# Patient Record
Sex: MALE | Race: WHITE | NOT HISPANIC OR LATINO | Employment: UNEMPLOYED | URBAN - NONMETROPOLITAN AREA
[De-identification: names, ages, dates, MRNs, and addresses within clinical notes are randomized per-mention and may not be internally consistent; named-entity substitution may affect disease eponyms.]

---

## 2018-02-22 ENCOUNTER — TELEPHONE (OUTPATIENT)
Dept: FAMILY MEDICINE CLINIC | Facility: CLINIC | Age: 58
End: 2018-02-22

## 2018-02-22 DIAGNOSIS — E11.65 TYPE 2 DIABETES MELLITUS WITH HYPERGLYCEMIA, WITHOUT LONG-TERM CURRENT USE OF INSULIN (HCC): Primary | ICD-10-CM

## 2018-02-22 RX ORDER — GLIMEPIRIDE 2 MG/1
2 TABLET ORAL
Qty: 90 TABLET | Refills: 1 | Status: SHIPPED | OUTPATIENT
Start: 2018-02-22

## 2018-02-22 NOTE — TELEPHONE ENCOUNTER
Patient needs refill on Glimeperide 2mg 1 tab po every Am with breakfast  Send to [facility]

## 2018-03-06 DIAGNOSIS — R60.1 GENERALIZED EDEMA: Primary | ICD-10-CM

## 2018-03-06 RX ORDER — FUROSEMIDE 40 MG/1
TABLET ORAL
Qty: 90 TABLET | Refills: 5 | Status: SHIPPED | OUTPATIENT
Start: 2018-03-06

## 2018-04-09 ENCOUNTER — OFFICE VISIT (OUTPATIENT)
Dept: FAMILY MEDICINE CLINIC | Facility: CLINIC | Age: 58
End: 2018-04-09
Payer: MEDICARE

## 2018-04-09 VITALS
TEMPERATURE: 98.1 F | BODY MASS INDEX: 31.55 KG/M2 | OXYGEN SATURATION: 95 % | DIASTOLIC BLOOD PRESSURE: 70 MMHG | WEIGHT: 201 LBS | SYSTOLIC BLOOD PRESSURE: 114 MMHG | RESPIRATION RATE: 18 BRPM | HEIGHT: 67 IN | HEART RATE: 63 BPM

## 2018-04-09 DIAGNOSIS — K40.91 UNILATERAL RECURRENT INGUINAL HERNIA WITHOUT OBSTRUCTION OR GANGRENE: ICD-10-CM

## 2018-04-09 DIAGNOSIS — E55.9 VITAMIN D DEFICIENCY: ICD-10-CM

## 2018-04-09 DIAGNOSIS — G62.9 NEUROPATHY: ICD-10-CM

## 2018-04-09 DIAGNOSIS — D50.9 IRON DEFICIENCY ANEMIA, UNSPECIFIED IRON DEFICIENCY ANEMIA TYPE: ICD-10-CM

## 2018-04-09 DIAGNOSIS — E53.8 VITAMIN B12 DEFICIENCY: ICD-10-CM

## 2018-04-09 DIAGNOSIS — R10.33 PERIUMBILICAL ABDOMINAL PAIN: ICD-10-CM

## 2018-04-09 DIAGNOSIS — I10 ESSENTIAL HYPERTENSION: ICD-10-CM

## 2018-04-09 DIAGNOSIS — R19.7 DIARRHEA, UNSPECIFIED TYPE: ICD-10-CM

## 2018-04-09 DIAGNOSIS — Z13.29 SCREENING FOR HYPOTHYROIDISM: ICD-10-CM

## 2018-04-09 DIAGNOSIS — K70.31 ALCOHOLIC CIRRHOSIS OF LIVER WITH ASCITES (HCC): ICD-10-CM

## 2018-04-09 DIAGNOSIS — E78.49 OTHER HYPERLIPIDEMIA: ICD-10-CM

## 2018-04-09 DIAGNOSIS — E11.65 TYPE 2 DIABETES MELLITUS WITH HYPERGLYCEMIA, WITHOUT LONG-TERM CURRENT USE OF INSULIN (HCC): Primary | ICD-10-CM

## 2018-04-09 DIAGNOSIS — K42.9 UMBILICAL HERNIA WITHOUT OBSTRUCTION AND WITHOUT GANGRENE: ICD-10-CM

## 2018-04-09 DIAGNOSIS — K70.31 ASCITES DUE TO ALCOHOLIC CIRRHOSIS (HCC): ICD-10-CM

## 2018-04-09 DIAGNOSIS — K21.9 GASTROESOPHAGEAL REFLUX DISEASE, ESOPHAGITIS PRESENCE NOT SPECIFIED: ICD-10-CM

## 2018-04-09 DIAGNOSIS — D50.8 OTHER IRON DEFICIENCY ANEMIA: ICD-10-CM

## 2018-04-09 PROCEDURE — 99204 OFFICE O/P NEW MOD 45 MIN: CPT | Performed by: NURSE PRACTITIONER

## 2018-04-09 RX ORDER — ERGOCALCIFEROL (VITAMIN D2) 1250 MCG
1 CAPSULE ORAL WEEKLY
COMMUNITY
End: 2018-04-23 | Stop reason: SDUPTHER

## 2018-04-09 RX ORDER — FOLIC ACID 1 MG/1
1 TABLET ORAL DAILY
Refills: 0 | COMMUNITY
Start: 2018-03-12 | End: 2018-04-09 | Stop reason: SDUPTHER

## 2018-04-09 RX ORDER — OMEPRAZOLE 40 MG/1
40 CAPSULE, DELAYED RELEASE ORAL DAILY
COMMUNITY
End: 2018-08-12 | Stop reason: SDUPTHER

## 2018-04-09 RX ORDER — FLUTICASONE PROPIONATE 110 UG/1
2 AEROSOL, METERED RESPIRATORY (INHALATION) 2 TIMES DAILY
COMMUNITY
Start: 2017-02-23 | End: 2018-10-22 | Stop reason: ALTCHOICE

## 2018-04-09 RX ORDER — FERROUS SULFATE 325(65) MG
1 TABLET ORAL DAILY
Refills: 0 | COMMUNITY
Start: 2018-03-12 | End: 2018-05-29 | Stop reason: SDUPTHER

## 2018-04-09 RX ORDER — FOLIC ACID 1 MG/1
1 TABLET ORAL DAILY
Qty: 90 TABLET | Refills: 1 | Status: SHIPPED | OUTPATIENT
Start: 2018-04-09 | End: 2018-09-07 | Stop reason: SDUPTHER

## 2018-04-09 RX ORDER — GABAPENTIN 300 MG/1
100 CAPSULE ORAL
COMMUNITY
End: 2018-04-09 | Stop reason: SDUPTHER

## 2018-04-09 RX ORDER — PROPRANOLOL HYDROCHLORIDE 20 MG/1
20 TABLET ORAL EVERY 12 HOURS SCHEDULED
COMMUNITY
End: 2018-07-09 | Stop reason: SDUPTHER

## 2018-04-09 RX ORDER — POTASSIUM CHLORIDE 20 MEQ/1
20 TABLET, EXTENDED RELEASE ORAL 2 TIMES DAILY
COMMUNITY
End: 2018-06-15 | Stop reason: SDUPTHER

## 2018-04-09 RX ORDER — SPIRONOLACTONE 50 MG/1
50 TABLET, FILM COATED ORAL DAILY
COMMUNITY
End: 2018-05-29 | Stop reason: SDUPTHER

## 2018-04-09 RX ORDER — GABAPENTIN 300 MG/1
300 CAPSULE ORAL
Qty: 90 CAPSULE | Refills: 1 | Status: SHIPPED | OUTPATIENT
Start: 2018-04-09

## 2018-04-09 RX ORDER — LACTULOSE 10 G/15ML
20 SOLUTION ORAL 3 TIMES DAILY
COMMUNITY
End: 2018-05-21 | Stop reason: SDUPTHER

## 2018-04-09 RX ORDER — FOLIC ACID 1 MG/1
1000 TABLET ORAL DAILY
Qty: 90 TABLET | Refills: 1 | Status: SHIPPED | OUTPATIENT
Start: 2018-04-09 | End: 2018-04-09 | Stop reason: SDUPTHER

## 2018-04-09 RX ORDER — LACTULOSE 10 G/10G
10 SOLUTION ORAL 3 TIMES DAILY
COMMUNITY
End: 2018-04-09 | Stop reason: CLARIF

## 2018-04-09 NOTE — PROGRESS NOTES
Assessment/Plan:      Diagnoses and all orders for this visit:    Type 2 diabetes mellitus with hyperglycemia, without long-term current use of insulin (HCC)  -     Comprehensive metabolic panel; Future  -     HEMOGLOBIN A1C W/ EAG ESTIMATION; Future  -     Insulin, fasting; Future  -     Microalbumin / creatinine urine ratio    Iron deficiency anemia, unspecified iron deficiency anemia type  -     CBC and differential; Future    Other hyperlipidemia  -     Lipid panel; Future    Vitamin B12 deficiency  -     Vitamin B12; Future    Vitamin D deficiency  -     Vitamin D 25 hydroxy; Future    Screening for hypothyroidism  -     TSH, 3rd generation with T4 reflex; Future    Alcoholic cirrhosis of liver with ascites (HCC)  -     Discontinue: folic acid (FOLVITE) 1 mg tablet; Take 1 tablet (1,000 mcg total) by mouth daily  -     folic acid (FOLVITE) 1 mg tablet; Take 1 tablet (1 mg total) by mouth daily  -     Ambulatory referral to Gastroenterology; Future    Essential hypertension    Other iron deficiency anemia    Asc[facility] due to [facility] ([facility] )  -     Ambulatory referral to Gastroenterology; Future    Gastroesophageal reflux disease, esophagitis presence not specified    Unilateral recurrent inguinal hernia without obstruction or gangrene  -     Ambulatory referral to Gastroenterology; Future    Neuropathy  -     gabapentin (NEURONTIN) 300 mg capsule; Take 1 capsule (300 mg total) by mouth daily at bedtime    Diarrhea, unspecified type  -     Ambulatory referral to Gastroenterology; Future    Periumbilical abdominal pain  -     Ambulatory referral to Gastroenterology; Future    Umbilical hernia without obstruction and without gangrene  -     Ambulatory referral to Gastroenterology; Future    Other orders  -     Discontinue: folic acid (FOLVITE) 1 mg tablet; Take 1 tablet by mouth daily  -     fluticasone (FLOVENT HFA) 110 MCG/ACT inhaler;  Inhale 2 puffs 2 (two) times a day  -     ferrous sulfate 325 (65 Fe) mg tablet; Take 1 tablet by mouth daily  -     ergocalciferol (ERGOCALCIFEROL) 62421 units capsule; Take 1 capsule by mouth once a week  -     Discontinue: lactulose (CEPHULAC) 10 g packet; Take 10 g by mouth 3 (three) times a day  -     lactulose (CHRONULAC) 10 g/15 mL solution; Take 20 g by mouth 3 (three) times a day  -     rifaximin (XIFAXAN) 550 mg tablet; Take 550 mg by mouth every 12 (twelve) hours  -     spironolactone (ALDACTONE) 50 mg tablet; Take 50 mg by mouth daily  -     propranolol (INDERAL) 20 mg tablet; Take 20 mg by mouth every 12 (twelve) hours  -     Discontinue: gabapentin (NEURONTIN) 300 mg capsule; Take 100 mg by mouth daily at bedtime  -     omeprazole (PriLOSEC) 40 MG capsule; Take 40 mg by mouth daily  -     potassium chloride (K-DUR,KLOR-CON) 20 mEq tablet; Take 20 mEq by mouth 2 (two) times a day 2 tabs in am and one tab in pm          Subjective:     Patient ID: Jenny Skinner  is a 61 y o  male  Patient presents to office for initial physical exam and to establish care at [address] [place] [address]  Patient continues to be followed by [facility] for Hx of Cirrhosis of Liver and Hepatitis C  Patient is currently on a Liver Transplant List at [facility]  Patient continues to be followed by [facility] for Hx of Thrombocytopenia  Complete medical history and medications reviewed with patient and tolerating all medications well  Patient C/O right inguinal hernia is increasing in size over the past 3 weeks and does have intermittent tenderness of right inguinal area along with pain in umbilical area that now has a lump of mid umbilical area  Patient C/O diarrhea occurring after eating every meal and pain in mid-umbilical area that occurs when he eats a lot of food  Review of Systems    GENERAL:  Feels well, denies any significant changes in weight without trying    SKIN:  Denies rashes, lesions, opened areas, wounds, change in moles or any other skin changes  HEENT:  Denies any head injury or headaches  Negative blurred vision, floaters, spots before eyes, infections, or other vision problems  Negative significant changes in vision or hearing  Negative tinnitus, vertigo, or infections  Negative hay fever, sinus trouble, nasal discharge, bloody noses, or problems with smell  Negative sore throat, bleeding gums, ulcers, or sores  Glasses/Contacts: Glasses  Hearing Aids: NO  Dentures/Partials/Implants: NO  NECK:  Denies lumps, goiter, pain, swollen glands, or lymphadenopathy  BREASTS:  Denies lumps, pain, nipple discharge, swelling, redness, or any other changes  RESPIRATORY:  Denies cough, wheezing, shortness of breath, dyspnea, or orthopnea  CARDIOVASCULAR:  Denies chest pain or palpitations  GASTROINTESTINAL:  Appetite good, denies nausea, vomiting, or indigestion  Bowel movements normal occurring about once daily or every other day  C/O mid umbilical lump that is tender to touch  URINARY:  Denies frequency, incontinence, dysuria, hematuria, nocturia, or recent flank pain  GENITAL:  Denies penile discharge, ulcerations, lesions, or other problems  C/O right inguinal hernia that is increasing in size and causing increased tenderness over the past 3 weeks  PERIPHERAL VASCULAR:  Denies varicosities, swelling, skin changes, or pain  MUSCULOSKELETAL:  Denies back, joint, or muscle pain  Negative problems with mobility or movement  PSYCHIATRIC:  Denies problems with depression, anxiety, anger, or other psychiatric symptoms  NEUROLOGIC:  Denies fainting, dizziness, memory problems, seizures, tingling, motor or sensory loss  HEMATOLOGIC:  Denies easy bruising, bleeding, or anemia  ENDOCRINE:  Denies thyroid problems, temperature intolerance, excessive sweating, or other endocrine symptoms  Objective:     Physical Exam   Nursing note and vitals reviewed        GENERAL:  Appears well nourished, well groomed, in no acute distress  SKIN:  Palms warm, dry, color good  Nails without clubbing or cyanosis  No lesions, ulcerations, or wounds  HEAD:  Hair is average texture  Scalp without lesions, normocephalic, and atraumatic  EYES:  Visual fields full by confrontation  Conjunctiva pink, sclera white, PERRLA  Extraocular movements intact  Disc margins sharp, without hemorrhages or exudate  No arteriolar narrowing or A-V nicking  EARS:  B/L ear canals clear  B/L TMs clear with + light reflex  Acuity good to whispered voice  Carey midline  AC>BC  NOSE: Mucosa pink, moist, septum midline  Negative sinus tenderness  B/L turbinates pink, moist, non-edematous without exudate  MOUTH:  Oral mucosa pink  Pharynx pink, moist, without swelling, redness, or exudate  Dentition ok  Tonsils without enlargement or exudate  Tongue midline  NECK:  Supple, trachea midline, Negative thyromegaly, lymphadenopathy, or swollen glands  LYMPH NODES:  Negative enlargement of neck, axillary, epitrochlear, or inguinal nodes  THORAX/LUNGS  Thorax symmetric with good excursion  Lungs resonant  Breath sounds vesicular with no added sounds  Diaphragm descends within normal limits  CARDIOVASCULAR:  Carotid upstrokes brisk and without bruits  Apical impulse discrete and tapping, barely palpable in the 5th ICS/MCL  Normal S1 and Normal S2, Negative S3 or S4  Negative murmurs, thrills, lifts, or heaves  ABDOMEN:  Protuberant, bowel sounds normal active x 4 quadrants  Ascites of abdomen  Umbilical hernia  Right inguinal hernia  Negative tenderness  Negative masses  Negative hepatomegaly  Negative splenomegaly  Negative costovertebral tenderness  EXTREMITIES:  Warm, calves supple, non-tender, negative for edema  Negative stasis pigmentation or ulcers  +2 pulses throughout  MUSCULOSKELETAL:  Negative joint deformities      Good range of motion in hands, wrists, elbows, shoulders, spine, hips, knees, and ankles  Negative spinal curvature  NEUROLOGICAL:  Mental status:  Awake, alert, and oriented to person, place, time, and event  Normal thought processes  Cranial Nerves:  II-XII intact  Motor:  Good muscle bulk and tone  Strength: 5/5 throughout  Cerebellar:  Rapid alternating movements, point-to-point movements intact  Gait stable and fluid  Sensory:  Pinprick, light touch, position sense, vibration, and stereogenesis intact  Romberg: Negative  Reflexes: +2 throughout

## 2018-04-09 NOTE — PATIENT INSTRUCTIONS
Cirrhosis   AMBULATORY CARE:   Cirrhosis  is long-term scarring of the liver  The liver makes enzymes and bile that help digest food and gives your body energy  It also removes harmful material from your body, such as alcohol and other chemicals  Cirrhosis is caused by repeated damage to your liver over time  Scar tissue starts to replace healthy liver tissue  The scar tissue prevents the liver from working properly  Common signs and symptoms of cirrhosis:  You may not have any signs or symptoms until your liver damage is severe  You may have any of the following:  · Fatigue    · Bleeding and bruising easily    · Swelling of your feet, legs, or abdomen    · Nausea, loss of appetite, and weight loss    · Itching    · Jaundice (yellowing of your skin or eyes)    · Black bowel movements or dark urine  Seek care immediately if:   · You have pain during a bowel movement and it is black or contains blood  · You have a fast heart rate and fast breathing  · You are dizzy or confused  · You have severe pain in your abdomen  · You have trouble breathing  · Your vomit looks like it has coffee grinds or blood in it  Contact your healthcare provider if:   · You have a fever  · You have red or itchy skin  · You are in pain and feel weak  · You have questions or concerns about your condition or care  Treatment  may include any of the following:  · Medicines  may be used to treat high blood pressure in the portal vein (the vein that goes to your liver)  You may also need medicine to decrease extra fluid that collects in an area such as your legs or abdomen  Medicines may be used to decrease itching, or to treat a bacterial or viral infection  · Surgery  may be used to create a channel inside your liver to increase blood flow  This will help decrease swelling in your abdomen and lower blood pressure in the portal vein  Your risk for bleeding in your esophagus and stomach will also be decreased   You may need a liver transplant if your liver fails  Do not drink alcohol:  Alcohol will cause more damage to your liver  Manage cirrhosis:   · Do not smoke  Nicotine and other chemicals in cigarettes and cigars can cause blood vessel and lung damage  Ask your healthcare provider for information if you currently smoke and need help to quit  E-cigarettes or smokeless tobacco still contain nicotine  Talk to your healthcare provider before you use these products  · Eat a variety of healthy foods  Healthy foods include fruits, vegetables, whole-grain breads, low-fat dairy products, beans, lean meat, and fish  Ask if you need to be on a special diet  · Reach or maintain a healthy weight  You may develop fatty liver disease if you are overweight  Ask your healthcare provider for a healthy weight for you  He can help you create a safe weight loss plan if you are overweight  · Limit sodium (salt)  You may need to decrease the amount of sodium you eat if you have swelling caused by fluid buildup  Sodium is found in table salt and salty foods such as canned foods, frozen foods, and potato chips  · Drink liquids as directed  Ask how much liquid to drink each day and which liquids are best for you  For most people, good liquids to drink are water, juice, and milk  Liquids can help your liver work better  · Ask about vaccines  You may have a hard time fighting infection because of cirrhosis  Vaccines help protect you against viruses that can cause diseases such as the flu or hepatitis  Viral hepatitis is caused by a virus that leads to inflammation of the liver  You may need a hepatitis A or B vaccine  You may also need a pneumonia vaccine  Always get a flu vaccine each year as soon as it becomes available  · Ask about medicines  Some medicines can harm your liver  Acetaminophen is an example  Talk to your healthcare provider about all your medicines   Do not take any over-the-counter medicine or herbal supplements until your healthcare provider says it is okay  Follow up with your healthcare provider as directed:  Write down your questions so you remember to ask them during your visits  © 2017 [employer] Information is for End User's use only and may not be sold, redistributed or otherwise used for commercial purposes  All illustrations and images included in CareNotes® are the copyrighted property of [employer]  or [employer]  The above information is an  only  It is not intended as medical advice for individual conditions or treatments  Talk to your doctor, nurse or pharmacist before following any medical regimen to see if it is safe and effective for you  Wellness Visit for Adults   WHAT YOU NEED TO KNOW:   What is a wellness visit? A wellness visit is when you see your healthcare provider to get screened for health problems  You can also get advice on how to stay healthy  Write down your questions so you remember to ask them  Ask your healthcare provider how often you should have a wellness visit  What happens at a wellness visit? Your healthcare provider will ask about your health, and your family history of health problems  This includes high blood pressure, heart disease, and cancer  He or she will ask if you have symptoms that concern you, if you smoke, and about your mood  You may also be asked about your intake of medicines, supplements, food, and alcohol  Any of the following may be done:  · Your weight  will be checked  Your height may also be checked so your body mass index (BMI) can be calculated  Your BMI shows if you are at a healthy weight  · Your blood pressure  and heart rate will be checked  Your temperature may also be checked  · Blood and urine tests  may be done  Blood tests may be done to check your cholesterol levels  Abnormal cholesterol levels increase your risk for heart disease and stroke   You may also need a blood or urine test to check for diabetes if you are at increased risk  Urine tests may be done to look for signs of an infection or kidney disease  · A physical exam  includes checking your heartbeat and lungs with a stethoscope  Your healthcare provider may also check your skin to look for sun damage  · Screening tests  may be recommended  A screening test is done to check for diseases that may not cause symptoms  The screening tests you may need depend on your age, gender, family history, and lifestyle habits  For example, colorectal screening may be recommended if you are 48years old or older  What screening tests do I need if I am a woman? · A Pap smear  is used to screen for cervical cancer  Pap smears are usually done every 3 to 5 years depending on your age  You may need them more often if you have had abnormal Pap smear test results in the past  Ask your healthcare provider how often you should have a Pap smear  · A mammogram  is an x-ray of your breasts to screen for breast cancer  Experts recommend mammograms every 2 years starting at age 48 years  You may need a mammogram at age 52 years or younger if you have an increased risk for breast cancer  Talk to your healthcare provider about when you should start having mammograms and how often you need them  What vaccines might I need? · Get an influenza vaccine  every year  The influenza vaccine protects you from the flu  Several types of viruses cause the flu  The viruses change over time, so new vaccines are made each year  · Get a tetanus-diphtheria (Td) booster vaccine  every 10 years  This vaccine protects you against tetanus and diphtheria  Tetanus is a severe infection that may cause painful muscle spasms and lockjaw  Diphtheria is a severe bacterial infection that causes a thick covering in the back of your mouth and throat       · Get a human papillomavirus (HPV) vaccine  if you are female and aged 23 to 32 or male 23 to 24 and never received it  This vaccine protects you from HPV infection  HPV is the most common infection spread by sexual contact  HPV may also cause vaginal, penile, and anal cancers  · Get a pneumococcal vaccine  if you are aged 72 years or older  The pneumococcal vaccine is an injection given to protect you from pneumococcal disease  Pneumococcal disease is an infection caused by pneumococcal bacteria  The infection may cause pneumonia, meningitis, or an ear infection  · Get a shingles vaccine  if you are aged 61 or older, even if you have had shingles before  The shingles vaccine is an injection to protect you from the varicella-zoster virus  This is the same virus that causes chickenpox  Shingles is a painful rash that develops in people who had chickenpox or have been exposed to the virus  How can I eat healthy? My Plate is a model for planning healthy meals  It shows the types and amounts of foods that should go on your plate  Fruits and vegetables make up about half of your plate, and grains and protein make up the other half  A serving of dairy is included on the side of your plate  The amount of calories and serving sizes you need depends on your age, gender, weight, and height  Examples of healthy foods are listed below:  · Eat a variety of vegetables  such as dark green, red, and orange vegetables  You can also include canned vegetables low in sodium (salt) and frozen vegetables without added butter or sauces  · Eat a variety of fresh fruits , canned fruit in 100% juice, frozen fruit, and dried fruit  · Include whole grains  At least half of the grains you eat should be whole grains  Examples include whole-wheat bread, wheat pasta, brown rice, and whole-grain cereals such as oatmeal     · Eat a variety of protein foods such as seafood (fish and shellfish), lean meat, and poultry without skin (turkey and chicken)   Examples of lean meats include pork leg, shoulder, or tenderloin, and beef round, sirloin, tenderloin, and extra lean ground beef  Other protein foods include eggs and egg substitutes, beans, peas, soy products, nuts, and seeds  · Choose low-fat dairy products such as skim or 1% milk or low-fat yogurt, cheese, and cottage cheese  · Limit unhealthy fats  such as butter, hard margarine, and shortening  How much exercise do I need? Exercise at least 30 minutes per day on most days of the week  Some examples of exercise include walking, biking, dancing, and swimming  You can also fit in more physical activity by taking the stairs instead of the elevator or parking farther away from stores  Include muscle strengthening activities 2 days each week  Regular exercise provides many health benefits  It helps you manage your weight, and decreases your risk for type 2 diabetes, heart disease, stroke, and high blood pressure  Exercise can also help improve your mood  Ask your healthcare provider about the best exercise plan for you  What are some general health and safety guidelines I should follow? · Do not smoke  Nicotine and other chemicals in cigarettes and cigars can cause lung damage  Ask your healthcare provider for information if you currently smoke and need help to quit  E-cigarettes or smokeless tobacco still contain nicotine  Talk to your healthcare provider before you use these products  · Limit alcohol  A drink of alcohol is 12 ounces of beer, 5 ounces of wine, or 1½ ounces of liquor  · Lose weight, if needed  Being overweight increases your risk of certain health conditions  These include heart disease, high blood pressure, type 2 diabetes, and certain types of cancer  · Protect your skin  Do not sunbathe or use tanning beds  Use sunscreen with a SPF 15 or higher  Apply sunscreen at least 15 minutes before you go outside  Reapply sunscreen every 2 hours  Wear protective clothing, hats, and sunglasses when you are outside  · Drive safely    Always wear your seatbelt  Make sure everyone in your car wears a seatbelt  A seatbelt can save your life if you are in an accident  Do not use your cell phone when you are driving  This could distract you and cause an accident  Pull over if you need to make a call or send a text message  · Practice safe sex  Use latex condoms if are sexually active and have more than one partner  Your healthcare provider may recommend screening tests for sexually transmitted infections (STIs)  · Wear helmets, lifejackets, and protective gear  Always wear a helmet when you ride a bike or motorcycle, go skiing, or play sports that could cause a head injury  Wear protective equipment when you play sports  Wear a lifejacket when you are on a boat or doing water sports  CARE AGREEMENT:   You have the right to help plan your care  Learn about your health condition and how it may be treated  Discuss treatment options with your caregivers to decide what care you want to receive  You always have the right to refuse treatment  The above information is an  only  It is not intended as medical advice for individual conditions or treatments  Talk to your doctor, nurse or pharmacist before following any medical regimen to see if it is safe and effective for you  © 2017 [facility] Information is for End User's use only and may not be sold, redistributed or otherwise used for commercial purposes  All illustrations and images included in CareNotes® are the copyrighted property of [employer]  or [employer]av[employer]  Chronic Hypertension   AMBULATORY CARE:   Hypertension  is high blood pressure (BP)  Your BP is the force of your blood moving against the walls of your arteries  Normal BP is less than 120/80  Prehypertension is between 120/80 and 139/89  Hypertension is 140/90 or higher  Hypertension causes your BP to get so high that your heart has to work much harder than normal  This can damage your heart   Chronic hypertension is a long-term condition that you can control with a healthy lifestyle or medicines  A controlled blood pressure helps protect your organs, such as your heart, lungs, brain, and kidneys  Common symptoms include the following:   Headache     Blurred vision    Chest pain     Dizziness or weakness     Trouble breathing     Nosebleeds  Call 911 for any of the following: You have discomfort in your chest that feels like squeezing, pressure, fullness, or pain  You become confused or have difficulty speaking  You suddenly feel lightheaded or have trouble breathing  You have pain or discomfort in your back, neck, jaw, stomach, or arm  Seek care immediately if:   You have a severe headache or vision loss  You have weakness in an arm or leg  Contact your healthcare provider if:   You feel faint, dizzy, confused, or drowsy  You have been taking your BP medicine and your BP is still higher than your healthcare provider says it should be  You have questions or concerns about your condition or care  Treatment for chronic hypertension  may include medicine to lower your BP and lower your cholesterol level  A low cholesterol level helps prevent heart disease and makes it easier to control your blood pressure  Heart disease can make your blood pressure harder to control  You may also need to make lifestyle changes  Take your medicine exactly as directed  Manage chronic hypertension:  Talk with your healthcare provider about these and other ways to manage hypertension:  Take your BP at home  Sit and rest for 5 minutes before you take your BP  Extend your arm and support it on a flat surface  Your arm should be at the same level as your heart  Follow the directions that came with your BP monitor  If possible, take at least 2 BP readings each time  Take your BP at least twice a day at the same times each day, such as morning and evening   Keep a record of your BP readings and bring it to your follow-up visits  Ask your healthcare provider what your blood pressure should be  Limit sodium (salt) as directed  Too much sodium can affect your fluid balance  Check labels to find low-sodium or no-salt-added foods  Some low-sodium foods use potassium salts for flavor  Too much potassium can also cause health problems  Your healthcare provider will tell you how much sodium and potassium are safe for you to have in a day  He or she may recommend that you limit sodium to 2,300 mg a day  Follow the meal plan recommended by your healthcare provider  A dietitian or your provider can give you more information on low-sodium plans or the DASH (Dietary Approaches to Stop Hypertension) eating plan  The DASH plan is low in sodium, unhealthy fats, and total fat  It is high in potassium, calcium, and fiber  Exercise to maintain a healthy weight  Exercise at least 30 minutes per day, on most days of the week  This will help decrease your blood pressure  Ask about the best exercise plan for you  Decrease stress  This may help lower your BP  Learn ways to relax, such as deep breathing or listening to music  Limit alcohol  Women should limit alcohol to 1 drink a day  Men should limit alcohol to 2 drinks a day  A drink of alcohol is 12 ounces of beer, 5 ounces of wine, or 1½ ounces of liquor  Do not smoke  Nicotine and other chemicals in cigarettes and cigars can increase your BP and also cause lung damage  Ask your healthcare provider for information if you currently smoke and need help to quit  E-cigarettes or smokeless tobacco still contain nicotine  Talk to your healthcare provider before you use these products  Follow up with your healthcare provider as directed: You will need to return to have your BP checked and to have other lab tests done  Write down your questions so you remember to ask them during your visits     © 2017 [employer] Information is for End User's use only and may not be sold, redistributed or otherwise used for commercial purposes  All illustrations and images included in CareNotes® are the copyrighted property of [employer]  or [employer]onal Maravilla  The above information is an  only  It is not intended as medical advice for individual conditions or treatments  Talk to your doctor, nurse or pharmacist before following any medical regimen to see if it is safe and effective for you

## 2018-04-12 ENCOUNTER — TELEPHONE (OUTPATIENT)
Dept: FAMILY MEDICINE CLINIC | Facility: CLINIC | Age: 58
End: 2018-04-12

## 2018-04-12 NOTE — TELEPHONE ENCOUNTER
Left message informing patient of appointment with Dr Becca MCDANIELS on Thursday May 3rd at 10:45  Left phone number if need to change

## 2018-04-23 ENCOUNTER — TELEPHONE (OUTPATIENT)
Dept: FAMILY MEDICINE CLINIC | Facility: CLINIC | Age: 58
End: 2018-04-23

## 2018-04-23 DIAGNOSIS — E55.9 VITAMIN D DEFICIENCY: Primary | ICD-10-CM

## 2018-04-23 RX ORDER — ERGOCALCIFEROL (VITAMIN D2) 1250 MCG
1 CAPSULE ORAL WEEKLY
Qty: 4 CAPSULE | Refills: 5 | Status: SHIPPED | OUTPATIENT
Start: 2018-04-23 | End: 2018-04-30 | Stop reason: SDUPTHER

## 2018-04-30 DIAGNOSIS — E55.9 VITAMIN D DEFICIENCY: ICD-10-CM

## 2018-04-30 RX ORDER — ERGOCALCIFEROL (VITAMIN D2) 1250 MCG
1 CAPSULE ORAL WEEKLY
Qty: 4 CAPSULE | Refills: 5 | Status: SHIPPED | OUTPATIENT
Start: 2018-04-30 | End: 2018-10-15 | Stop reason: SDUPTHER

## 2018-05-07 LAB — HBA1C MFR BLD HPLC: 5.2 %

## 2018-05-21 DIAGNOSIS — K70.31 ALCOHOLIC CIRRHOSIS OF LIVER WITH ASCITES (HCC): Primary | ICD-10-CM

## 2018-05-21 RX ORDER — LACTULOSE 10 G/15ML
20 SOLUTION ORAL 3 TIMES DAILY
Qty: 3000 ML | Refills: 5 | Status: SHIPPED | OUTPATIENT
Start: 2018-05-21

## 2018-05-29 DIAGNOSIS — K70.11 ASCITES DUE TO ALCOHOLIC HEPATITIS: Primary | ICD-10-CM

## 2018-05-29 DIAGNOSIS — D63.8 ANEMIA IN OTHER CHRONIC DISEASES CLASSIFIED ELSEWHERE: ICD-10-CM

## 2018-05-29 DIAGNOSIS — R60.0 LOCALIZED EDEMA: ICD-10-CM

## 2018-05-29 RX ORDER — SPIRONOLACTONE 50 MG/1
50 TABLET, FILM COATED ORAL DAILY
Qty: 90 TABLET | Refills: 1 | Status: SHIPPED | OUTPATIENT
Start: 2018-05-29 | End: 2018-06-13 | Stop reason: SDUPTHER

## 2018-05-29 RX ORDER — FERROUS SULFATE 325(65) MG
1 TABLET ORAL DAILY
Qty: 90 TABLET | Refills: 1 | Status: SHIPPED | OUTPATIENT
Start: 2018-05-29 | End: 2018-10-15 | Stop reason: SDUPTHER

## 2018-06-13 DIAGNOSIS — R60.0 LOCALIZED EDEMA: ICD-10-CM

## 2018-06-13 DIAGNOSIS — K70.11 ASCITES DUE TO ALCOHOLIC HEPATITIS: ICD-10-CM

## 2018-06-13 RX ORDER — SPIRONOLACTONE 50 MG/1
50 TABLET, FILM COATED ORAL 4 TIMES DAILY PRN
Qty: 120 TABLET | Refills: 2 | Status: SHIPPED | OUTPATIENT
Start: 2018-06-13 | End: 2018-07-09 | Stop reason: SDUPTHER

## 2018-06-13 NOTE — TELEPHONE ENCOUNTER
Pharmacy does not have spironolactone in stock and would like to transfer prescription to [facility], but prescription is  and there is discrepancy as to how often patient taking  [facility] prescribed 4 x daily prn, office notes here are daily   Patient's girlfriend states that he does take medication 4 x a day due to edema

## 2018-06-15 DIAGNOSIS — E87.6 HYPOKALEMIA: Primary | ICD-10-CM

## 2018-06-18 RX ORDER — POTASSIUM CHLORIDE 20 MEQ/1
TABLET, EXTENDED RELEASE ORAL
Qty: 90 TABLET | Refills: 5 | Status: SHIPPED | OUTPATIENT
Start: 2018-06-18 | End: 2018-10-15 | Stop reason: SDUPTHER

## 2018-07-05 DIAGNOSIS — K74.69 OTHER CIRRHOSIS OF LIVER (HCC): Primary | ICD-10-CM

## 2018-07-09 DIAGNOSIS — K70.11 ASCITES DUE TO ALCOHOLIC HEPATITIS: ICD-10-CM

## 2018-07-09 DIAGNOSIS — R60.0 LOCALIZED EDEMA: ICD-10-CM

## 2018-07-09 RX ORDER — PROPRANOLOL HYDROCHLORIDE 20 MG/1
TABLET ORAL
Qty: 60 TABLET | Refills: 5 | Status: SHIPPED | OUTPATIENT
Start: 2018-07-09

## 2018-07-09 RX ORDER — SPIRONOLACTONE 50 MG/1
TABLET, FILM COATED ORAL
Qty: 120 TABLET | Refills: 5 | Status: SHIPPED | OUTPATIENT
Start: 2018-07-09

## 2018-08-12 DIAGNOSIS — K21.00 GASTROESOPHAGEAL REFLUX DISEASE WITH ESOPHAGITIS: Primary | ICD-10-CM

## 2018-08-13 RX ORDER — OMEPRAZOLE 40 MG/1
CAPSULE, DELAYED RELEASE ORAL
Qty: 30 CAPSULE | Refills: 5 | Status: SHIPPED | OUTPATIENT
Start: 2018-08-13

## 2018-09-07 DIAGNOSIS — K70.31 ALCOHOLIC CIRRHOSIS OF LIVER WITH ASCITES (HCC): ICD-10-CM

## 2018-09-10 RX ORDER — FOLIC ACID 1 MG/1
1 TABLET ORAL DAILY
Qty: 90 TABLET | Refills: 3 | Status: SHIPPED | OUTPATIENT
Start: 2018-09-10 | End: 2018-09-12 | Stop reason: SDUPTHER

## 2018-09-12 DIAGNOSIS — K70.31 ALCOHOLIC CIRRHOSIS OF LIVER WITH ASCITES (HCC): ICD-10-CM

## 2018-09-12 RX ORDER — FOLIC ACID 1 MG/1
1 TABLET ORAL DAILY
Qty: 90 TABLET | Refills: 5 | Status: SHIPPED | OUTPATIENT
Start: 2018-09-12 | End: 2018-10-02 | Stop reason: SDUPTHER

## 2018-10-02 ENCOUNTER — TELEPHONE (OUTPATIENT)
Dept: FAMILY MEDICINE CLINIC | Facility: CLINIC | Age: 58
End: 2018-10-02

## 2018-10-02 DIAGNOSIS — K70.31 ALCOHOLIC CIRRHOSIS OF LIVER WITH ASCITES (HCC): ICD-10-CM

## 2018-10-02 RX ORDER — FOLIC ACID 1 MG/1
1 TABLET ORAL DAILY
Qty: 90 TABLET | Refills: 1 | Status: SHIPPED | OUTPATIENT
Start: 2018-10-02

## 2018-10-10 ENCOUNTER — OFFICE VISIT (OUTPATIENT)
Dept: FAMILY MEDICINE CLINIC | Facility: CLINIC | Age: 58
End: 2018-10-10
Payer: MEDICARE

## 2018-10-10 VITALS
DIASTOLIC BLOOD PRESSURE: 58 MMHG | HEIGHT: 67 IN | HEART RATE: 96 BPM | WEIGHT: 179.2 LBS | SYSTOLIC BLOOD PRESSURE: 95 MMHG | OXYGEN SATURATION: 89 % | RESPIRATION RATE: 18 BRPM | BODY MASS INDEX: 28.12 KG/M2 | TEMPERATURE: 97.6 F

## 2018-10-10 DIAGNOSIS — K42.9 UMBILICAL HERNIA WITHOUT OBSTRUCTION AND WITHOUT GANGRENE: ICD-10-CM

## 2018-10-10 DIAGNOSIS — E11.65 TYPE 2 DIABETES MELLITUS WITH HYPERGLYCEMIA, WITHOUT LONG-TERM CURRENT USE OF INSULIN (HCC): ICD-10-CM

## 2018-10-10 DIAGNOSIS — I10 ESSENTIAL HYPERTENSION: ICD-10-CM

## 2018-10-10 DIAGNOSIS — K40.91 UNILATERAL RECURRENT INGUINAL HERNIA WITHOUT OBSTRUCTION OR GANGRENE: ICD-10-CM

## 2018-10-10 DIAGNOSIS — D72.828 OTHER ELEVATED WHITE BLOOD CELL (WBC) COUNT: ICD-10-CM

## 2018-10-10 DIAGNOSIS — K21.9 GASTROESOPHAGEAL REFLUX DISEASE, ESOPHAGITIS PRESENCE NOT SPECIFIED: ICD-10-CM

## 2018-10-10 DIAGNOSIS — K70.31 ALCOHOLIC CIRRHOSIS OF LIVER WITH ASCITES (HCC): Primary | ICD-10-CM

## 2018-10-10 DIAGNOSIS — G62.9 NEUROPATHY: ICD-10-CM

## 2018-10-10 DIAGNOSIS — E78.49 OTHER HYPERLIPIDEMIA: ICD-10-CM

## 2018-10-10 DIAGNOSIS — J18.9 COMMUNITY ACQUIRED PNEUMONIA, UNSPECIFIED LATERALITY: ICD-10-CM

## 2018-10-10 DIAGNOSIS — D50.9 IRON DEFICIENCY ANEMIA, UNSPECIFIED IRON DEFICIENCY ANEMIA TYPE: ICD-10-CM

## 2018-10-10 PROCEDURE — 99214 OFFICE O/P EST MOD 30 MIN: CPT | Performed by: NURSE PRACTITIONER

## 2018-10-10 RX ORDER — LEVOFLOXACIN 500 MG/1
500 TABLET, FILM COATED ORAL EVERY 24 HOURS
Qty: 5 TABLET | Refills: 0 | Status: SHIPPED | OUTPATIENT
Start: 2018-10-10 | End: 2018-10-15

## 2018-10-10 RX ORDER — ALBUTEROL SULFATE 2.5 MG/3ML
SOLUTION RESPIRATORY (INHALATION)
Qty: 100 VIAL | Refills: 5 | Status: SHIPPED | OUTPATIENT
Start: 2018-10-10

## 2018-10-10 NOTE — PROGRESS NOTES
Assessment/Plan:      Diagnoses and all orders for this visit:    Alcoholic cirrhosis of liver with ascites ([facility] [address] )  -     Comprehensive metabolic panel; Future  -     Lactic acid, plasma; Future  -     Ammonia; Future    Gastroesophageal reflux disease, esophagitis presence not specified    Type 2 diabetes mellitus with hyperglycemia, without long-term current use of insulin (HCC)    Essential hypertension    Neuropathy    Other hyperlipidemia    Iron deficiency anemia, unspecified iron deficiency anemia type    Umbilical hernia without obstruction and without gangrene    Unilateral recurrent inguinal hernia without obstruction or gangrene    Community acquired pneumonia, unspecified laterality  -     XR chest pa & lateral; Future  -     albuterol (2 5 mg/3 mL) 0 083 % nebulizer solution; 1 nebulizer tx every 6 hours x 10 days then prn SOB, Congestion  -     Nebulizer  -     Nebulizer Supplies  -     levofloxacin (LEVAQUIN) 500 mg tablet; Take 1 tablet (500 mg total) by mouth every 24 hours for 5 days    Other elevated white blood cell (WBC) count  -     XR chest pa & lateral; Future  -     CBC and differential; Future          Subjective:     Patient ID: Megha Saldana  is a 60 y o  male  Patient presents to office for follow up from hospitalization at [facility] for Sepsis, Community Acquired Pneumonia, Liver Cirrhosis, Ascites, and Portal Hypertension  Patient has paracentesis with 14 liters removed on 9/24/18, 12 3 liters removed on 9/28/18, and 6 8 liters removed on 10/3/18  Patient had Colonoscopy with biopsy done on 10/4/18 by Dr Jose Luis Estevez Specialist on 10/4/18  Patient was prescribed Levaquin 500 mg daily as outpatient and was discharged on 10/4/18  Patient is currently using his Home Oxygen @ 2L/min via NC as prescribed by [facility] Physician  Patient does not have his Oxygen today with him for office visit    Patient is scheduled for appointment with [facility] Liver Transplant Team and Hepatology for follow up on Ascites and Liver Cirrhosis  Review of Systems    GENERAL:  Feels well, denies any significant changes in weight without trying  SKIN:  Denies rashes, lesions, opened areas, wounds, change in moles or any other skin changes  HEENT:  Denies any head injury or headaches  Negative blurred vision, floaters, spots before eyes, infections, or other vision problems  Negative significant changes in vision or hearing  Negative tinnitus, vertigo, or infections  Negative hay fever, sinus trouble, nasal discharge, bloody noses, or problems with smell  Negative sore throat, bleeding gums, ulcers, or sores  Glasses/Contacts: Glasses  Hearing Aids: NO  Dentures/Partials/Implants: NO  NECK:  Denies lumps, goiter, pain, swollen glands, or lymphadenopathy  BREASTS:  Denies lumps, pain, nipple discharge, swelling, redness, or any other changes  RESPIRATORY:  Denies cough, wheezing, shortness of breath, dyspnea, or orthopnea  CARDIOVASCULAR:  Denies chest pain or palpitations  GASTROINTESTINAL:  Appetite good, denies nausea, vomiting, or indigestion  Bowel movements normal occurring about once daily or every other day  Continues with umbilical hernia  URINARY:  Denies frequency, incontinence, dysuria, hematuria, nocturia, or recent flank pain  GENITAL:  Denies penile discharge, ulcerations, lesions, or other problems  Continues with right inguinal hernia  PERIPHERAL VASCULAR:  Denies varicosities, swelling, skin changes, or pain  MUSCULOSKELETAL:  Denies back, joint, or muscle pain  Negative problems with mobility or movement  PSYCHIATRIC:  Denies problems with depression, anxiety, anger, or other psychiatric symptoms  NEUROLOGIC:  Denies fainting, dizziness, memory problems, seizures, tingling, motor or sensory loss  HEMATOLOGIC:  Denies easy bruising, bleeding, or anemia    ENDOCRINE:  Denies thyroid problems, temperature intolerance, excessive sweating, or other endocrine symptoms  Objective:     Physical Exam   Nursing note and vitals reviewed  GENERAL:  Appears well nourished, well groomed, in no acute distress  SKIN:  Palms warm, dry, color good  Nails without clubbing or cyanosis  No lesions, ulcerations, or wounds  HEAD:  Hair is average texture  Scalp without lesions, normocephalic, and atraumatic  EYES:  Visual fields full by confrontation  Conjunctiva pink, sclera white, PERRLA  Extraocular movements intact  EARS:  B/L ear canals clear  B/L TMs clear with + light reflex  Acuity good to whispered voice  NOSE: Mucosa pink, moist, septum midline  Negative sinus tenderness  B/L turbinates pink, moist, non-edematous without exudate  MOUTH:  Oral mucosa pink  Pharynx pink, moist, without swelling, redness, or exudate  Dentition ok  Tonsils without enlargement or exudate  Tongue midline  NECK:  Supple, trachea midline, Negative thyromegaly, lymphadenopathy, or swollen glands  LYMPH NODES:  Negative enlargement of neck, axillary, epitrochlear, or inguinal nodes  THORAX/LUNGS  Thorax symmetric with good excursion  Lungs resonant  Breath sounds wheezing and crackles of left lower lobe  Diaphragm descends within normal limits  CARDIOVASCULAR:  Carotid upstrokes brisk and without bruits  Apical impulse discrete and tapping, barely palpable in the 5th ICS/MCL  Normal S1 and Normal S2, Negative S3 or S4  Negative murmurs, thrills, lifts, or heaves  ABDOMEN:  Protuberant, bowel sounds normal active x 4 quadrants  Severe ascites of abdomen  Umbilical hernia  Right inguinal hernia    + generalized abdominal tenderness  Negative masses  Negative hepatomegaly  Negative splenomegaly  Negative costovertebral tenderness  EXTREMITIES:  Warm, calves supple, non-tender, negative for edema  Negative stasis pigmentation or ulcers  +2 pulses throughout  MUSCULOSKELETAL:  Negative joint deformities      Good range of motion in hands, wrists, elbows, shoulders, spine, hips, knees, and ankles  Negative spinal curvature  NEUROLOGICAL:  Mental status:  Awake, alert, and oriented to person, place, time, and event   Normal thought processes

## 2018-10-10 NOTE — PATIENT INSTRUCTIONS
Cirrhosis   AMBULATORY CARE:   Cirrhosis  is long-term scarring of the liver  The liver makes enzymes and bile that help digest food and gives your body energy  It also removes harmful material from your body, such as alcohol and other chemicals  Cirrhosis is caused by repeated damage to your liver over time  Scar tissue starts to replace healthy liver tissue  The scar tissue prevents the liver from working properly  Common signs and symptoms of cirrhosis:  You may not have any signs or symptoms until your liver damage is severe  You may have any of the following:  · Fatigue    · Bleeding and bruising easily    · Swelling of your feet, legs, or abdomen    · Nausea, loss of appetite, and weight loss    · Itching    · Jaundice (yellowing of your skin or eyes)    · Black bowel movements or dark urine  Seek care immediately if:   · You have pain during a bowel movement and it is black or contains blood  · You have a fast heart rate and fast breathing  · You are dizzy or confused  · You have severe pain in your abdomen  · You have trouble breathing  · Your vomit looks like it has coffee grinds or blood in it  Contact your healthcare provider if:   · You have a fever  · You have red or itchy skin  · You are in pain and feel weak  · You have questions or concerns about your condition or care  Treatment  may include any of the following:  · Medicines  may be used to treat high blood pressure in the portal vein (the vein that goes to your liver)  You may also need medicine to decrease extra fluid that collects in an area such as your legs or abdomen  Medicines may be used to decrease itching, or to treat a bacterial or viral infection  · Surgery  may be used to create a channel inside your liver to increase blood flow  This will help decrease swelling in your abdomen and lower blood pressure in the portal vein  Your risk for bleeding in your esophagus and stomach will also be decreased   You may need a liver transplant if your liver fails  Do not drink alcohol:  Alcohol will cause more damage to your liver  Manage cirrhosis:   · Do not smoke  Nicotine and other chemicals in cigarettes and cigars can cause blood vessel and lung damage  Ask your healthcare provider for information if you currently smoke and need help to quit  E-cigarettes or smokeless tobacco still contain nicotine  Talk to your healthcare provider before you use these products  · Eat a variety of healthy foods  Healthy foods include fruits, vegetables, whole-grain breads, low-fat dairy products, beans, lean meat, and fish  Ask if you need to be on a special diet  · Reach or maintain a healthy weight  You may develop fatty liver disease if you are overweight  Ask your healthcare provider for a healthy weight for you  He can help you create a safe weight loss plan if you are overweight  · Limit sodium (salt)  You may need to decrease the amount of sodium you eat if you have swelling caused by fluid buildup  Sodium is found in table salt and salty foods such as canned foods, frozen foods, and potato chips  · Drink liquids as directed  Ask how much liquid to drink each day and which liquids are best for you  For most people, good liquids to drink are water, juice, and milk  Liquids can help your liver work better  · Ask about vaccines  You may have a hard time fighting infection because of cirrhosis  Vaccines help protect you against viruses that can cause diseases such as the flu or hepatitis  Viral hepatitis is caused by a virus that leads to inflammation of the liver  You may need a hepatitis A or B vaccine  You may also need a pneumonia vaccine  Always get a flu vaccine each year as soon as it becomes available  · Ask about medicines  Some medicines can harm your liver  Acetaminophen is an example  Talk to your healthcare provider about all your medicines   Do not take any over-the-counter medicine or herbal supplements until your healthcare provider says it is okay  Follow up with your healthcare provider as directed:  Write down your questions so you remember to ask them during your visits  © 2017 [employer] Information is for End User's use only and may not be sold, redistributed or otherwise used for commercial purposes  All illustrations and images included in CareNotes® are the copyrighted property of [employer]  or [employer]onal Maravilla  The above information is an  only  It is not intended as medical advice for individual conditions or treatments  Talk to your doctor, nurse or pharmacist before following any medical regimen to see if it is safe and effective for you  Wellness Visit for Adults   WHAT YOU NEED TO KNOW:   What is a wellness visit? A wellness visit is when you see your healthcare provider to get screened for health problems  You can also get advice on how to stay healthy  Write down your questions so you remember to ask them  Ask your healthcare provider how often you should have a wellness visit  What happens at a wellness visit? Your healthcare provider will ask about your health, and your family history of health problems  This includes high blood pressure, heart disease, and cancer  He or she will ask if you have symptoms that concern you, if you smoke, and about your mood  You may also be asked about your intake of medicines, supplements, food, and alcohol  Any of the following may be done:  · Your weight  will be checked  Your height may also be checked so your body mass index (BMI) can be calculated  Your BMI shows if you are at a healthy weight  · Your blood pressure  and heart rate will be checked  Your temperature may also be checked  · Blood and urine tests  may be done  Blood tests may be done to check your cholesterol levels  Abnormal cholesterol levels increase your risk for heart disease and stroke   You may also need a blood or urine test to check for diabetes if you are at increased risk  Urine tests may be done to look for signs of an infection or kidney disease  · A physical exam  includes checking your heartbeat and lungs with a stethoscope  Your healthcare provider may also check your skin to look for sun damage  · Screening tests  may be recommended  A screening test is done to check for diseases that may not cause symptoms  The screening tests you may need depend on your age, gender, family history, and lifestyle habits  For example, colorectal screening may be recommended if you are 48years old or older  What screening tests do I need if I am a woman? · A Pap smear  is used to screen for cervical cancer  Pap smears are usually done every 3 to 5 years depending on your age  You may need them more often if you have had abnormal Pap smear test results in the past  Ask your healthcare provider how often you should have a Pap smear  · A mammogram  is an x-ray of your breasts to screen for breast cancer  Experts recommend mammograms every 2 years starting at age 48 years  You may need a mammogram at age 52 years or younger if you have an increased risk for breast cancer  Talk to your healthcare provider about when you should start having mammograms and how often you need them  What vaccines might I need? · Get an influenza vaccine  every year  The influenza vaccine protects you from the flu  Several types of viruses cause the flu  The viruses change over time, so new vaccines are made each year  · Get a tetanus-diphtheria (Td) booster vaccine  every 10 years  This vaccine protects you against tetanus and diphtheria  Tetanus is a severe infection that may cause painful muscle spasms and lockjaw  Diphtheria is a severe bacterial infection that causes a thick covering in the back of your mouth and throat       · Get a human papillomavirus (HPV) vaccine  if you are female and aged 23 to 32 or male 23 to 24 and never received it  This vaccine protects you from HPV infection  HPV is the most common infection spread by sexual contact  HPV may also cause vaginal, penile, and anal cancers  · Get a pneumococcal vaccine  if you are aged 72 years or older  The pneumococcal vaccine is an injection given to protect you from pneumococcal disease  Pneumococcal disease is an infection caused by pneumococcal bacteria  The infection may cause pneumonia, meningitis, or an ear infection  · Get a shingles vaccine  if you are aged 61 or older, even if you have had shingles before  The shingles vaccine is an injection to protect you from the varicella-zoster virus  This is the same virus that causes chickenpox  Shingles is a painful rash that develops in people who had chickenpox or have been exposed to the virus  How can I eat healthy? My Plate is a model for planning healthy meals  It shows the types and amounts of foods that should go on your plate  Fruits and vegetables make up about half of your plate, and grains and protein make up the other half  A serving of dairy is included on the side of your plate  The amount of calories and serving sizes you need depends on your age, gender, weight, and height  Examples of healthy foods are listed below:  · Eat a variety of vegetables  such as dark green, red, and orange vegetables  You can also include canned vegetables low in sodium (salt) and frozen vegetables without added butter or sauces  · Eat a variety of fresh fruits , canned fruit in 100% juice, frozen fruit, and dried fruit  · Include whole grains  At least half of the grains you eat should be whole grains  Examples include whole-wheat bread, wheat pasta, brown rice, and whole-grain cereals such as oatmeal     · Eat a variety of protein foods such as seafood (fish and shellfish), lean meat, and poultry without skin (turkey and chicken)   Examples of lean meats include pork leg, shoulder, or tenderloin, and beef round, sirloin, tenderloin, and extra lean ground beef  Other protein foods include eggs and egg substitutes, beans, peas, soy products, nuts, and seeds  · Choose low-fat dairy products such as skim or 1% milk or low-fat yogurt, cheese, and cottage cheese  · Limit unhealthy fats  such as butter, hard margarine, and shortening  How much exercise do I need? Exercise at least 30 minutes per day on most days of the week  Some examples of exercise include walking, biking, dancing, and swimming  You can also fit in more physical activity by taking the stairs instead of the elevator or parking farther away from stores  Include muscle strengthening activities 2 days each week  Regular exercise provides many health benefits  It helps you manage your weight, and decreases your risk for type 2 diabetes, heart disease, stroke, and high blood pressure  Exercise can also help improve your mood  Ask your healthcare provider about the best exercise plan for you  What are some general health and safety guidelines I should follow? · Do not smoke  Nicotine and other chemicals in cigarettes and cigars can cause lung damage  Ask your healthcare provider for information if you currently smoke and need help to quit  E-cigarettes or smokeless tobacco still contain nicotine  Talk to your healthcare provider before you use these products  · Limit alcohol  A drink of alcohol is 12 ounces of beer, 5 ounces of wine, or 1½ ounces of liquor  · Lose weight, if needed  Being overweight increases your risk of certain health conditions  These include heart disease, high blood pressure, type 2 diabetes, and certain types of cancer  · Protect your skin  Do not sunbathe or use tanning beds  Use sunscreen with a SPF 15 or higher  Apply sunscreen at least 15 minutes before you go outside  Reapply sunscreen every 2 hours  Wear protective clothing, hats, and sunglasses when you are outside  · Drive safely    Always wear your seatbelt  Make sure everyone in your car wears a seatbelt  A seatbelt can save your life if you are in an accident  Do not use your cell phone when you are driving  This could distract you and cause an accident  Pull over if you need to make a call or send a text message  · Practice safe sex  Use latex condoms if are sexually active and have more than one partner  Your healthcare provider may recommend screening tests for sexually transmitted infections (STIs)  · Wear helmets, lifejackets, and protective gear  Always wear a helmet when you ride a bike or motorcycle, go skiing, or play sports that could cause a head injury  Wear protective equipment when you play sports  Wear a lifejacket when you are on a boat or doing water sports  CARE AGREEMENT:   You have the right to help plan your care  Learn about your health condition and how it may be treated  Discuss treatment options with your caregivers to decide what care you want to receive  You always have the right to refuse treatment  The above information is an  only  It is not intended as medical advice for individual conditions or treatments  Talk to your doctor, nurse or pharmacist before following any medical regimen to see if it is safe and effective for you  © 2017 [facility] Information is for End User's use only and may not be sold, redistributed or otherwise used for commercial purposes  All illustrations and images included in CareNotes® are the copyrighted property of [employer] M [employer]  or Donal Maravilla

## 2018-10-12 DIAGNOSIS — K70.31 ALCOHOLIC CIRRHOSIS OF LIVER WITH ASCITES (HCC): ICD-10-CM

## 2018-10-12 DIAGNOSIS — E87.6 HYPOKALEMIA: ICD-10-CM

## 2018-10-12 DIAGNOSIS — E55.9 VITAMIN D DEFICIENCY: ICD-10-CM

## 2018-10-12 DIAGNOSIS — D63.8 ANEMIA IN OTHER CHRONIC DISEASES CLASSIFIED ELSEWHERE: ICD-10-CM

## 2018-10-12 DIAGNOSIS — E87.1 HYPONATREMIA: Primary | ICD-10-CM

## 2018-10-12 LAB — HBA1C MFR BLD HPLC: 5.4 %

## 2018-10-15 ENCOUNTER — TELEPHONE (OUTPATIENT)
Dept: FAMILY MEDICINE CLINIC | Facility: CLINIC | Age: 58
End: 2018-10-15

## 2018-10-15 RX ORDER — SODIUM CHLORIDE 1000 MG
1 TABLET, SOLUBLE MISCELLANEOUS 3 TIMES DAILY
Qty: 90 TABLET | Refills: 1 | Status: SHIPPED | OUTPATIENT
Start: 2018-10-15

## 2018-10-15 RX ORDER — FERROUS SULFATE 325(65) MG
1 TABLET ORAL 2 TIMES DAILY WITH MEALS
Qty: 180 TABLET | Refills: 1 | Status: SHIPPED | OUTPATIENT
Start: 2018-10-15 | End: 2018-11-12 | Stop reason: SDUPTHER

## 2018-10-15 RX ORDER — ERGOCALCIFEROL (VITAMIN D2) 1250 MCG
1 CAPSULE ORAL WEEKLY
Qty: 12 CAPSULE | Refills: 1 | Status: SHIPPED | OUTPATIENT
Start: 2018-10-15

## 2018-10-15 RX ORDER — POTASSIUM CHLORIDE 20 MEQ/1
20 TABLET, EXTENDED RELEASE ORAL 2 TIMES DAILY
Qty: 60 TABLET | Refills: 5 | Status: SHIPPED | OUTPATIENT
Start: 2018-10-15

## 2018-10-15 NOTE — TELEPHONE ENCOUNTER
Patients girlfriend called with medications that were perscribed to him in the hospital  Ceftin 250mg 1 tab BID, Pantoprazole coated 40mg, Oxycodone 5mg PRN q6hrs, and Nystatin 1000 1mL 5mL 4x daily

## 2018-10-17 ENCOUNTER — TELEPHONE (OUTPATIENT)
Dept: FAMILY MEDICINE CLINIC | Facility: CLINIC | Age: 58
End: 2018-10-17

## 2018-10-17 NOTE — TELEPHONE ENCOUNTER
Please notify patient I escribed Sodium Chloride 1gm 3 x daily due to low sodium level and I want him to decrease his Potassium Chloride 20 meq to 1 tab po 2 x daily due to elevated Potassium Level and have repeat CMP and CBC with diff drawn in 4 weeks  Lab order placed on 10/15/18

## 2018-10-18 DIAGNOSIS — K70.31 ALCOHOLIC CIRRHOSIS OF LIVER WITH ASCITES (HCC): Primary | ICD-10-CM

## 2018-10-18 DIAGNOSIS — R10.84 GENERALIZED ABDOMINAL PAIN: ICD-10-CM

## 2018-10-18 RX ORDER — OXYCODONE HYDROCHLORIDE 5 MG/1
TABLET ORAL
Refills: 0 | COMMUNITY
Start: 2018-10-05 | End: 2018-10-18 | Stop reason: SDUPTHER

## 2018-10-18 RX ORDER — OXYCODONE HYDROCHLORIDE 5 MG/1
5 TABLET ORAL EVERY 6 HOURS PRN
Qty: 90 TABLET | Refills: 0 | Status: SHIPPED | OUTPATIENT
Start: 2018-10-18

## 2018-10-19 DIAGNOSIS — R06.02 SHORTNESS OF BREATH: ICD-10-CM

## 2018-10-19 DIAGNOSIS — J18.9 PNEUMONIA DUE TO INFECTIOUS ORGANISM, UNSPECIFIED LATERALITY, UNSPECIFIED PART OF LUNG: ICD-10-CM

## 2018-10-19 DIAGNOSIS — J98.11 ATELECTASIS: Primary | ICD-10-CM

## 2018-10-19 DIAGNOSIS — J45.40 MODERATE PERSISTENT ASTHMA, UNSPECIFIED WHETHER COMPLICATED: ICD-10-CM

## 2018-10-22 RX ORDER — BUDESONIDE AND FORMOTEROL FUMARATE DIHYDRATE 160; 4.5 UG/1; UG/1
2 AEROSOL RESPIRATORY (INHALATION) 2 TIMES DAILY
Qty: 1 INHALER | Refills: 5 | Status: SHIPPED | OUTPATIENT
Start: 2018-10-22 | End: 2018-10-23 | Stop reason: SDUPTHER

## 2018-10-23 ENCOUNTER — TELEPHONE (OUTPATIENT)
Dept: FAMILY MEDICINE CLINIC | Facility: CLINIC | Age: 58
End: 2018-10-23

## 2018-10-23 DIAGNOSIS — J98.11 ATELECTASIS: ICD-10-CM

## 2018-10-23 DIAGNOSIS — J45.40 MODERATE PERSISTENT ASTHMA, UNSPECIFIED WHETHER COMPLICATED: ICD-10-CM

## 2018-10-23 DIAGNOSIS — J18.9 PNEUMONIA DUE TO INFECTIOUS ORGANISM, UNSPECIFIED LATERALITY, UNSPECIFIED PART OF LUNG: ICD-10-CM

## 2018-10-23 DIAGNOSIS — R06.02 SHORTNESS OF BREATH: ICD-10-CM

## 2018-10-23 RX ORDER — BUDESONIDE AND FORMOTEROL FUMARATE DIHYDRATE 160; 4.5 UG/1; UG/1
2 AEROSOL RESPIRATORY (INHALATION) 2 TIMES DAILY
Qty: 1 INHALER | Refills: 5 | Status: SHIPPED | OUTPATIENT
Start: 2018-10-23 | End: 2018-11-01 | Stop reason: ALTCHOICE

## 2018-10-23 RX ORDER — LEVOFLOXACIN 500 MG/1
500 TABLET, FILM COATED ORAL DAILY
Qty: 7 TABLET | Refills: 1 | Status: SHIPPED | OUTPATIENT
Start: 2018-10-23 | End: 2018-10-30

## 2018-10-23 NOTE — TELEPHONE ENCOUNTER
Please instruct patient that I escribed Symbicort HFA 160mcg/Inh si puffs INH every AM and PM instead of Flovent HFA  I also escribed Rx Levaquin 500mg daily x 7 days to [facility]

## 2018-11-01 DIAGNOSIS — J43.1 PANLOBULAR EMPHYSEMA (HCC): Primary | ICD-10-CM

## 2018-11-05 DIAGNOSIS — K21.00 GASTROESOPHAGEAL REFLUX DISEASE WITH ESOPHAGITIS: Primary | ICD-10-CM

## 2018-11-05 RX ORDER — PANTOPRAZOLE SODIUM 40 MG/1
40 TABLET, DELAYED RELEASE ORAL DAILY
Qty: 30 TABLET | Refills: 5 | Status: SHIPPED | OUTPATIENT
Start: 2018-11-05

## 2018-11-05 RX ORDER — PANTOPRAZOLE SODIUM 40 MG/1
TABLET, DELAYED RELEASE ORAL
Refills: 0 | COMMUNITY
Start: 2018-10-03 | End: 2018-11-05 | Stop reason: SDUPTHER

## 2018-11-12 DIAGNOSIS — D63.8 ANEMIA IN OTHER CHRONIC DISEASES CLASSIFIED ELSEWHERE: ICD-10-CM

## 2018-11-12 RX ORDER — FERROUS SULFATE 325(65) MG
1 TABLET ORAL 2 TIMES DAILY WITH MEALS
Qty: 180 TABLET | Refills: 1 | Status: SHIPPED | OUTPATIENT
Start: 2018-11-12 | End: 2018-11-29 | Stop reason: SDUPTHER

## 2018-11-19 ENCOUNTER — OFFICE VISIT (OUTPATIENT)
Dept: FAMILY MEDICINE CLINIC | Facility: CLINIC | Age: 58
End: 2018-11-19
Payer: MEDICARE

## 2018-11-19 VITALS
WEIGHT: 153.8 LBS | HEIGHT: 67 IN | OXYGEN SATURATION: 95 % | DIASTOLIC BLOOD PRESSURE: 42 MMHG | HEART RATE: 68 BPM | SYSTOLIC BLOOD PRESSURE: 70 MMHG | BODY MASS INDEX: 24.14 KG/M2 | RESPIRATION RATE: 18 BRPM | TEMPERATURE: 98 F

## 2018-11-19 DIAGNOSIS — K40.91 UNILATERAL RECURRENT INGUINAL HERNIA WITHOUT OBSTRUCTION OR GANGRENE: ICD-10-CM

## 2018-11-19 DIAGNOSIS — K42.9 UMBILICAL HERNIA WITHOUT OBSTRUCTION AND WITHOUT GANGRENE: ICD-10-CM

## 2018-11-19 DIAGNOSIS — I95.89 OTHER SPECIFIED HYPOTENSION: ICD-10-CM

## 2018-11-19 DIAGNOSIS — G62.9 NEUROPATHY: ICD-10-CM

## 2018-11-19 DIAGNOSIS — K70.31 ALCOHOLIC CIRRHOSIS OF LIVER WITH ASCITES (HCC): ICD-10-CM

## 2018-11-19 DIAGNOSIS — E11.65 TYPE 2 DIABETES MELLITUS WITH HYPERGLYCEMIA, WITHOUT LONG-TERM CURRENT USE OF INSULIN (HCC): ICD-10-CM

## 2018-11-19 DIAGNOSIS — K21.9 GASTROESOPHAGEAL REFLUX DISEASE, ESOPHAGITIS PRESENCE NOT SPECIFIED: ICD-10-CM

## 2018-11-19 DIAGNOSIS — K72.10 CHRONIC LIVER FAILURE WITHOUT HEPATIC COMA (HCC): ICD-10-CM

## 2018-11-19 DIAGNOSIS — D50.9 IRON DEFICIENCY ANEMIA, UNSPECIFIED IRON DEFICIENCY ANEMIA TYPE: ICD-10-CM

## 2018-11-19 DIAGNOSIS — E78.49 OTHER HYPERLIPIDEMIA: ICD-10-CM

## 2018-11-19 DIAGNOSIS — Z00.00 ENCOUNTER FOR MEDICARE ANNUAL WELLNESS EXAM: Primary | ICD-10-CM

## 2018-11-19 PROCEDURE — G0439 PPPS, SUBSEQ VISIT: HCPCS | Performed by: NURSE PRACTITIONER

## 2018-11-19 PROCEDURE — 99214 OFFICE O/P EST MOD 30 MIN: CPT | Performed by: NURSE PRACTITIONER

## 2018-11-19 NOTE — PATIENT INSTRUCTIONS
Ascites   WHAT YOU NEED TO KNOW:   What is ascites? Ascites is excess fluid in your lower abdomen  The fluid causes swelling  Ascites can signal a more serious problem in your body  What causes ascites? · Liver disease, such as cirrhosis or hepatitis     · Cancer     · Congestive heart failure     · Blood clots in the veins that enter and leave the liver  What are the signs and symptoms of ascites? · Rapid weight gain and swelling     · Swollen abdomen     · Shortness of breath     · Stretch marks and bulging veins on the abdomen     · Nausea     · A feeling of fullness after eating little food  How is ascites diagnosed? Your healthcare provider will ask you to lie on your back while he taps or presses on your abdomen  He will ask about your symptoms and when they started  He will ask about alcohol or IV (intravenous) drug use, sexual activity, and any blood transfusions you have had  Tell him if you have had heart problems or cancer  · Blood and urine tests:  Healthcare providers will test samples of your blood and urine to see how your liver and kidneys are working  · Abdominal ultrasound:  Gel helps your healthcare provider move a sensor over your abdomen during an ultrasound  The sensor uses sound waves to show pictures of your organs on a monitor  · CT scan:  A CT scan uses a computer to take x-rays of the organs and blood vessels in your abdomen  You may be given dye called contrast to help the pictures show up better  Tell the healthcare provider if you are allergic to iodine or seafood  You may also be allergic to the dye  · Ascites fluid test:  Healthcare providers use a needle to take a sample of fluid from your abdomen for testing  This procedure is called a fluid tap or paracentesis  Numbing medicine makes you more comfortable during this test  Tests on the fluid help find the cause of your ascites and screen for infection       · 24-hour urine collection:  You will use a container to hold all of your urine collected over 24 hours  The urine must be kept cold until it is tested  · Other tests:  Tests such as endoscopy or laparoscopy use a scope (tube) with a camera on its tip to see your lower esophagus or liver  Anesthesia medicine will keep you numb or asleep during the procedure  Healthcare providers will look for bleeding or blood clots  A tissue sample (biopsy) may be taken  How is ascites treated? Ascites treatment usually combines medicines with changes to your eating and drinking habits  Other treatments are used if your ascites does not improve or if the condition that caused the ascites is getting worse  · Medicines:      ¨ Diuretics:  Diuretics help decrease the fluid in your abdomen by causing you to urinate more often  You will lose weight as the fluid decreases  Ask your healthcare provider how much weight you should expect to lose each day  ¨ Antibiotics: These medicines are used to prevent or fight infections caused by bacteria  ¨ Vaccines or antiviral medicines:  When ascites is caused by hepatitis (a virus that attacks your liver), antiviral medicines may help prevent more damage to your liver  Healthcare providers may give you vaccines to prevent hepatitis  · Sodium and liquid restriction:      ¨ Sodium:  You must lower the amount of sodium (salt) you eat and drink to reduce the fluid in your abdomen  Sodium is in many foods, even when you cannot taste it  You may need to be on a 2-gram sodium diet, which limits your sodium to less than half a teaspoon each day  Ask your healthcare provider for more information about low-sodium diets  ¨ Liquid:  Healthcare providers may ask you to limit your liquids to about 34 ounces (about 2 pints) a day if you are not losing fluid with a low-sodium diet  · Procedures and surgeries:      ¨ Paracentesis:  Your healthcare provider drains the fluid out of your abdomen through a needle   You will receive numbing medicine before the procedure  Paracentesis can quickly remove quarts of fluid  Paracentesis may be repeated if the ascites does not respond to other treatments  ¨ Transjugular intrahepatic portosystemic shunt: This procedure is also called TIPS  TIPS can treat large ascites when you cannot have paracentesis  Your healthcare provider uses a catheter (plastic tube) to increase blood flow through your liver  This helps to reduce the fluid in your abdomen  ¨ Liver transplant:  Sometimes your liver is so damaged that a liver transplant is the only long-term treatment option  ¨ Peritoneovenous shunt: This procedure drains the fluid into a large vein to be absorbed by the body  The shunt is a tube placed in your abdomen and connected to the vein  Healthcare providers may use this procedure if you cannot have a liver transplant or other ascites treatments  What are the risks of ascites? · The excess fluid may affect your ability to breathe  Your swollen abdomen can make it hard to eat  Hernias (weak areas in the muscles on your abdomen) may form from the pressure of the fluid  Treatment can change your electrolyte (body chemical) balance  Electrolyte changes can cause confusion, drowsiness, and thinking and movement problems that may lead to coma  You may get an infection called spontaneous bacterial peritonitis in your abdomen that can be life-threatening  You will need more medicines or treatments if you develop these problems  · Even with treatment, the condition that caused your ascites may become life-threatening  Bleeding in the esophagus or liver or kidney failure can occur  How do I manage ascites? · Do not drink alcohol:  Alcohol worsens the damage to your liver  Your ascites may improve or even go away after you stop drinking  Do not take medicines that contain alcohol  Ask your healthcare provider for information if you need help to quit drinking alcohol       · Follow your low-sodium plan:  A dietitian can help you create a low-sodium diet  He may suggest lemon juice or herbs to flavor your food  Avoid salted butter or margarine, milk, cheese, and canned or frozen foods that are not made for low-salt diets  Ask your healthcare provider or dietitian before you use salt substitutes  · Write down your daily weight: You will need to track your weight at home so healthcare providers can see if treatment is working  Weigh yourself each day  Ask your healthcare provider how much weight you should be losing  Your healthcare provider will want to know if you are losing too much or too little weight  · Ask about NSAIDs:  NSAIDs are over-the-counter pain and fever medicines  You may not urinate enough to take NSAIDs safely  Ask your healthcare provider if NSAIDs are safe for you  Follow directions carefully  These medicines can cause stomach bleeding or kidney problems if they are not taken correctly  · Limit activity: Too much physical activity may make your ascites worse  Ask your healthcare provider if you have any limits to your normal daily activities  Rarely, bedrest is needed if other health problems develop with ascites  Where can I find more information? · [employer] [facility]  [address] [place] , [address]  Phone: [phone]  Web Address: [employer]  When should I contact my healthcare provider? Contact your healthcare provider if:  · You are losing more or less weight than expected  · You are urinating less than usual      · You feel dizzy or lightheaded  · You develop tiredness, dry mouth, nausea, or vomiting  · You have muscle cramps or twitches  · You have questions or concerns about your condition or care  When should I seek immediate care? Seek care immediately or call 911 if:  · You have a fever  · You feel pain in your abdomen  · You have trouble breathing       · You feel confused, faint, or lose consciousness  · You vomit blood or see blood in your bowel movement  CARE AGREEMENT:   You have the right to help plan your care  Learn about your health condition and how it may be treated  Discuss treatment options with your caregivers to decide what care you want to receive  You always have the right to refuse treatment  The above information is an  only  It is not intended as medical advice for individual conditions or treatments  Talk to your doctor, nurse or pharmacist before following any medical regimen to see if it is safe and effective for you  © 2017 [address]  Information is for End User's use only and may not be sold, redistributed or otherwise used for commercial purposes  All illustrations and images included in CareNotes® are the copyrighted property of [employer]  or Donal Maravilla  Hypotension   WHAT YOU NEED TO KNOW:   What is hypotension? Hypotension is a condition that causes your blood pressure (BP) to drop lower than it should be  Hypotension may be mild, serious, or life-threatening  What are the most common types of hypotension? · Acute:  Acute hypotension is a sudden drop in your BP that may be life-threatening  · Constitutional:  Constitutional hypotension means your BP is lower than it should be most or all of the time  This is a chronic condition that occurs with no known medical cause  · Orthostatic:  Orthostatic hypotension normally occurs when you stand up from a sitting or lying position  It is also called postural hypotension  · Postprandial:  Postprandial hypotension means your BP becomes too low after you eat a meal  Your BP may drop within 2 hours after you eat, and is more common when you eat meals high in carbohydrates  What causes hypotension?    · Anemia or blood loss    · Nervous system, heart, or adrenal disorders    · Dehydration from not drinking enough liquids, frequent vomiting, diarrhea, or severe burns    · Some medicines, such as those used to treat high blood pressure, heart conditions, pain, depression, or cancer    · A blood infection (sepsis)  What increases my risk for hypotension? · Increasing age    · Drug and alcohol use    · Being bedridden for a long period of time    · Low body weight    · Hemodialysis    · Medical conditions such as diabetes, Parkinson disease, and Alzheimer disease  What are the signs and symptoms of hypotension? · Feeling anxious, nauseated, tired, or weak    · Lightheadedness, dizziness, or fainting    · Increased sweating, palpitations (fast, forceful heartbeats), tremors, or a seizure     · Headache or pain in your neck, shoulders, chest, lower back, buttocks, or legs    · Blurred vision or changes in vision    · Confusion, decreased memory, or inability to pay attention  How is hypotension diagnosed? Your healthcare provider will ask about your symptoms, health conditions, and medicines  Tell him how often you have symptoms, and if they change during the day  Tell him if you recently have had diarrhea, vomiting, or blood loss  Your healthcare provider will carefully examine you, listen to your heart, and may check your eyes  He may check your body movements and sensations (ability to feel something that touches you)  You may also need the following:  · Blood pressure testing: This may be done while you lie down, sit, and stand  You may need to wear a BP monitor to record your BP for up to 24 hours  · Tilt table testing: You are secured on a table that changes your position  Your BP is checked when the table moves you into each position  What tests may help find the cause of my hypotension? Many times, hypotension is a symptom of another condition  You may need any of the following tests to find the cause of your hypotension:  · Blood tests:  A sample of your blood or urine may be checked for anemia or other conditions causing your hypotension  · EKG:   This test records the electrical activity of your heart  It is used to check for damage or other heart problems that may be causing your hypotension  · Autonomic nervous system tests:  Your healthcare provider may check for changes in how fast your heart beats when you take deep breaths  He may also check for changes in your BP while you put your hand in ice cold water  · 24 hour urine test: During this test you will need to collect all of your urine for 24 hours  You will urinate into a container and the urine will be put into a jug  The jug will need to be kept cold  If you urinate during the night, you will need to save that urine  Caregivers will measure and record how much you urinate  At the end of 24 hours, the urine will be sent to a lab for tests  · Echocardiogram:  This is a type of ultrasound, also called an echo  An ultrasound uses sound waves to show pictures of your heart on a monitor  An ultrasound may be done to show how your heart moves when it beats  How is hypotension treated? Your healthcare provider will work with you to find the cause of your hypotension and help treat your symptoms  You may need the following:  · Compression stockings or abdominal binder: These may help blood return to your heart and decrease your hypotension  · IV fluids: These may be used to increase your BP if you are dehydrated or have blood loss or sepsis  · Medicines:      ¨ Alpha-adrenoreceptor agonists: These medicines may increase your BP and decrease your symptoms  ¨ Steroids: This medicine helps prevent salt loss from your body  Steroids may also help increase the amount of fluid in your body and raise your BP  ¨ Vasopressors: These medicines help constrict (make smaller) your blood vessels and increase your BP  Vasopressor medicines may increase the blood flow to your brain and help decrease your symptoms  ¨ Antidiuretic hormone:   This medicine helps control your BP and helps decrease your need to urinate during the night  ¨ Antiparkinson medicine: This medicine may help increase your standing BP and decrease your symptoms  What are the risks of hypotension? Without treatment, your symptoms may get worse  You may faint or fall often, which can lead to injuries, such as a broken bone  You may be at increased risk for depression, confusion, and memory problems  Hypotension may cause decreased blood flow to your brain and heart  This may lead to a stroke or heart attack and can be life-threatening  Sepsis-related hypotension is life-threatening without treatment  How can I manage my symptoms? · Change your position slowly:  When you get out of bed, sit up first, then slowly move your legs to the side of the bed  If you are not having any symptoms, slowly stand up  If you have symptoms, sit down right away  · Avoid straining:  Activities and movements that cause you to strain can cause a drop in your BP  Activities to avoid include lifting, coughing, and other movements that increase the feeling of pressure in your chest     · Avoid the heat: This can cause a decrease in your BP  Stay inside during very hot days, or limit the amount of time you are outside  Do not take hot baths  · Exercise and do physical counter maneuvers:  Ask your healthcare provider about the best exercise plan for you  Physical counter maneuvers may help to increase your BP and increase blood flow to your heart  They include crossing your legs, squatting, and bending at the waist  You can also rise up on your toes while you are standing, and tighten your thigh muscles  · Drink liquids as directed:  Ask your healthcare provider how much liquid to drink each day and which liquids are best for you  Drink 500 milliliters (½ liter) of liquid quickly in the morning or before meals to help increase your BP  Your healthcare provider may tell you to drink 2 cups of coffee with, or after, breakfast and lunch   The caffeine in the coffee can help prevent a drop in your BP  Your healthcare provider may also give you caffeine pills  · Change how you eat meals: If your BP drops after you eat large meals, try to eat smaller meals more often  Eat foods low in carbohydrates and cholesterol to help prevent BP drops after you eat  Ask if you need to increase the amount of sodium (salt) you eat each day  · Raise the head of your bed:  Raise the head of your bed 4 to 8 inches  This can help prevent morning BP drops and decrease the need to urinate during the night  · Do not drink alcohol:  Alcohol can make your symptoms worse  Ask for information if you need help quitting  When should I contact my healthcare provider? · You vomit several times or have diarrhea, and you cannot drink liquid  · You have a fever  · You have new or increased symptoms, such as dizziness, weakness, or fainting  · Your legs, ankles, and feet are swollen, or you gain weight for no known reason  · You have questions or concerns about your condition or care  When should I seek immediate care or call 911? · You become confused or cannot speak  · You urinate very little or not at all  · You have a seizure  · You have chest pain or trouble breathing  · You have changes in vision or cannot see  CARE AGREEMENT:   You have the right to help plan your care  Learn about your health condition and how it may be treated  Discuss treatment options with your caregivers to decide what care you want to receive  You always have the right to refuse treatment  The above information is an  only  It is not intended as medical advice for individual conditions or treatments  Talk to your doctor, nurse or pharmacist before following any medical regimen to see if it is safe and effective for you    © 2017 [employer] Information is for End User's use only and may not be sold, redistributed or otherwise used for commercial purposes  All illustrations and images included in CareNotes® are the copyrighted property of [employer]  or Donal Maravilla  Wellness Visit for Adults   WHAT YOU NEED TO KNOW:   What is a wellness visit? A wellness visit is when you see your healthcare provider to get screened for health problems  You can also get advice on how to stay healthy  Write down your questions so you remember to ask them  Ask your healthcare provider how often you should have a wellness visit  What happens at a wellness visit? Your healthcare provider will ask about your health, and your family history of health problems  This includes high blood pressure, heart disease, and cancer  He or she will ask if you have symptoms that concern you, if you smoke, and about your mood  You may also be asked about your intake of medicines, supplements, food, and alcohol  Any of the following may be done:  · Your weight  will be checked  Your height may also be checked so your body mass index (BMI) can be calculated  Your BMI shows if you are at a healthy weight  · Your blood pressure  and heart rate will be checked  Your temperature may also be checked  · Blood and urine tests  may be done  Blood tests may be done to check your cholesterol levels  Abnormal cholesterol levels increase your risk for heart disease and stroke  You may also need a blood or urine test to check for diabetes if you are at increased risk  Urine tests may be done to look for signs of an infection or kidney disease  · A physical exam  includes checking your heartbeat and lungs with a stethoscope  Your healthcare provider may also check your skin to look for sun damage  · Screening tests  may be recommended  A screening test is done to check for diseases that may not cause symptoms  The screening tests you may need depend on your age, gender, family history, and lifestyle habits   For example, colorectal screening may be recommended if you are 48years old or older  What screening tests do I need if I am a woman? · A Pap smear  is used to screen for cervical cancer  Pap smears are usually done every 3 to 5 years depending on your age  You may need them more often if you have had abnormal Pap smear test results in the past  Ask your healthcare provider how often you should have a Pap smear  · A mammogram  is an x-ray of your breasts to screen for breast cancer  Experts recommend mammograms every 2 years starting at age 48 years  You may need a mammogram at age 52 years or younger if you have an increased risk for breast cancer  Talk to your healthcare provider about when you should start having mammograms and how often you need them  What vaccines might I need? · Get an influenza vaccine  every year  The influenza vaccine protects you from the flu  Several types of viruses cause the flu  The viruses change over time, so new vaccines are made each year  · Get a tetanus-diphtheria (Td) booster vaccine  every 10 years  This vaccine protects you against tetanus and diphtheria  Tetanus is a severe infection that may cause painful muscle spasms and lockjaw  Diphtheria is a severe bacterial infection that causes a thick covering in the back of your mouth and throat  · Get a human papillomavirus (HPV) vaccine  if you are female and aged 23 to 32 or male 23 to 24 and never received it  This vaccine protects you from HPV infection  HPV is the most common infection spread by sexual contact  HPV may also cause vaginal, penile, and anal cancers  · Get a pneumococcal vaccine  if you are aged 72 years or older  The pneumococcal vaccine is an injection given to protect you from pneumococcal disease  Pneumococcal disease is an infection caused by pneumococcal bacteria  The infection may cause pneumonia, meningitis, or an ear infection  · Get a shingles vaccine  if you are aged 61 or older, even if you have had shingles before   The shingles vaccine is an injection to protect you from the varicella-zoster virus  This is the same virus that causes chickenpox  Shingles is a painful rash that develops in people who had chickenpox or have been exposed to the virus  How can I eat healthy? My Plate is a model for planning healthy meals  It shows the types and amounts of foods that should go on your plate  Fruits and vegetables make up about half of your plate, and grains and protein make up the other half  A serving of dairy is included on the side of your plate  The amount of calories and serving sizes you need depends on your age, gender, weight, and height  Examples of healthy foods are listed below:  · Eat a variety of vegetables  such as dark green, red, and orange vegetables  You can also include canned vegetables low in sodium (salt) and frozen vegetables without added butter or sauces  · Eat a variety of fresh fruits , canned fruit in 100% juice, frozen fruit, and dried fruit  · Include whole grains  At least half of the grains you eat should be whole grains  Examples include whole-wheat bread, wheat pasta, brown rice, and whole-grain cereals such as oatmeal     · Eat a variety of protein foods such as seafood (fish and shellfish), lean meat, and poultry without skin (turkey and chicken)  Examples of lean meats include pork leg, shoulder, or tenderloin, and beef round, sirloin, tenderloin, and extra lean ground beef  Other protein foods include eggs and egg substitutes, beans, peas, soy products, nuts, and seeds  · Choose low-fat dairy products such as skim or 1% milk or low-fat yogurt, cheese, and cottage cheese  · Limit unhealthy fats  such as butter, hard margarine, and shortening  How much exercise do I need? Exercise at least 30 minutes per day on most days of the week  Some examples of exercise include walking, biking, dancing, and swimming   You can also fit in more physical activity by taking the stairs instead of the elevator or parking farther away from stores  Include muscle strengthening activities 2 days each week  Regular exercise provides many health benefits  It helps you manage your weight, and decreases your risk for type 2 diabetes, heart disease, stroke, and high blood pressure  Exercise can also help improve your mood  Ask your healthcare provider about the best exercise plan for you  What are some general health and safety guidelines I should follow? · Do not smoke  Nicotine and other chemicals in cigarettes and cigars can cause lung damage  Ask your healthcare provider for information if you currently smoke and need help to quit  E-cigarettes or smokeless tobacco still contain nicotine  Talk to your healthcare provider before you use these products  · Limit alcohol  A drink of alcohol is 12 ounces of beer, 5 ounces of wine, or 1½ ounces of liquor  · Lose weight, if needed  Being overweight increases your risk of certain health conditions  These include heart disease, high blood pressure, type 2 diabetes, and certain types of cancer  · Protect your skin  Do not sunbathe or use tanning beds  Use sunscreen with a SPF 15 or higher  Apply sunscreen at least 15 minutes before you go outside  Reapply sunscreen every 2 hours  Wear protective clothing, hats, and sunglasses when you are outside  · Drive safely  Always wear your seatbelt  Make sure everyone in your car wears a seatbelt  A seatbelt can save your life if you are in an accident  Do not use your cell phone when you are driving  This could distract you and cause an accident  Pull over if you need to make a call or send a text message  · Practice safe sex  Use latex condoms if are sexually active and have more than one partner  Your healthcare provider may recommend screening tests for sexually transmitted infections (STIs)  · Wear helmets, lifejackets, and protective gear    Always wear a helmet when you ride a bike or motorcycle, go skiing, or play sports that could cause a head injury  Wear protective equipment when you play sports  Wear a lifejacket when you are on a boat or doing water sports  CARE AGREEMENT:   You have the right to help plan your care  Learn about your health condition and how it may be treated  Discuss treatment options with your caregivers to decide what care you want to receive  You always have the right to refuse treatment  The above information is an  only  It is not intended as medical advice for individual conditions or treatments  Talk to your doctor, nurse or pharmacist before following any medical regimen to see if it is safe and effective for you  © 2017 [facility] Information is for End User's use only and may not be sold, redistributed or otherwise used for commercial purposes  All illustrations and images included in CareNotes® are the copyrighted property of A D A M [employer]  or Donal Maravilla

## 2018-11-19 NOTE — PROGRESS NOTES
Assessment and Plan:    Problem List Items Addressed This Visit     Alcoholic cirrhosis of liver with ascites ([address] )    Other hyperlipidemia    Type 2 diabetes mellitus with hyperglycemia, without long-term current use of insulin (HCC)    Iron deficiency anemia    Gastroesophageal reflux disease    Unilateral recurrent inguinal hernia without obstruction or gangrene    Neuropathy    Umbilical hernia without obstruction and without gangrene    Chronic liver failure without hepatic coma ([address] )      Other Visit Diagnoses     Encounter for Medicare annual wellness exam    -  Primary    Other specified hypotension            Health Maintenance Due   Topic Date Due    URINE MICROALBUMIN  04[birth year]26/1969    Pneumococcal PPSV23 Medium Risk Adult (1 of 1 - PPSV23) 04/26/1978         HPI:  Stevo Wolf  is a 60 y o  male here for his Subsequent Wellness Visit      Patient Active Problem List   Diagnosis    Alcoholic cirrhosis of liver with ascites ([address] )    Other hyperlipidemia    Type 2 diabetes mellitus with hyperglycemia, without long-term current use of insulin (HCC)    Iron deficiency anemia    Essential hypertension    Ascites due to alcoholic cirrhosis ([address] )    Gastroesophageal reflux disease    Unilateral recurrent inguinal hernia without obstruction or gangrene    Neuropathy    Umbilical hernia without obstruction and without gangrene    Periumbilical abdominal pain    Diarrhea    Chronic liver failure without hepatic coma ([address] )     Past Medical History:   Diagnosis Date    Ascitic fluid     Asthma     Cirrhosis (HCC)     Cirrhosis of liver ([address] )     Diabetes mellitus ([address] )     Edema     GERD (gastroesophageal reflux disease)     Hemorrhoids     Hepatitis C     History of liver failure     Hypertension     Inguinal hernia recurrent unilateral      Past Surgical History:   Procedure Laterality Date    HERNIA REPAIR Right     inguinal    PARACENTESIS      UMBILICAL HERNIA REPAIR       Family History   Problem Relation Age of Onset    Family history unknown: Yes     History   Smoking Status    Current Every Day Smoker    Packs/day: 0 25    Types: Cigarettes   Smokeless Tobacco    Never Used     History   Alcohol Use No     Comment: previous      History   Drug Use No     Comment: previous       Current Outpatient Prescriptions   Medication Sig Dispense Refill    albuterol (2 5 mg/3 mL) 0 083 % nebulizer solution 1 nebulizer tx every 6 hours x 10 days then prn SOB, Congestion 100 vial 5    ergocalciferol (ERGOCALCIFEROL) 33389 units capsule Take 1 capsule (50,000 Units total) by mouth once a week 12 capsule 1    ferrous sulfate 325 (65 Fe) mg tablet Take 1 tablet (325 mg total) by mouth 2 (two) times a day with meals 180 tablet 1    fluticasone-salmeterol (ADVAIR HFA) 115-21 MCG/ACT inhaler Inhale 2 puffs 2 (two) times a day Rinse mouth after use   1 Inhaler 5    folic acid (FOLVITE) 1 mg tablet Take 1 tablet (1 mg total) by mouth daily 90 tablet 1    furosemide (LASIX) 40 mg tablet 2 tablets po every morning and 1 tablet po every pm 90 tablet 5    gabapentin (NEURONTIN) 300 mg capsule Take 1 capsule (300 mg total) by mouth daily at bedtime 90 capsule 1    glimepiride (AMARYL) 2 mg tablet Take 1 tablet (2 mg total) by mouth daily with breakfast 90 tablet 1    lactulose (CHRONULAC) 10 g/15 mL solution Take 30 mL (20 g total) by mouth 3 (three) times a day 3000 mL 5    omeprazole (PriLOSEC) 40 MG capsule take 1 capsule by mouth once daily 30 capsule 5    oxyCODONE (ROXICODONE) 5 mg immediate release tablet Take 1 tablet (5 mg total) by mouth every 6 (six) hours as needed for moderate pain Max Daily Amount: 20 mg 90 tablet 0    pantoprazole (PROTONIX) 40 mg tablet Take 1 tablet (40 mg total) by mouth daily 30 tablet 5    potassium chloride (K-DUR,KLOR-CON) 20 mEq tablet Take 1 tablet (20 mEq total) by mouth 2 (two) times a day 60 tablet 5    propranolol (INDERAL) 20 mg tablet take 1 tablet by mouth twice a day 60 tablet 5    sodium chloride 1 g tablet Take 1 tablet (1 g total) by mouth 3 (three) times a day 90 tablet 1    spironolactone (ALDACTONE) 50 mg tablet take 1 tablet by mouth four times a day as directed 120 tablet 5     No current facility-administered medications for this visit  Allergies   Allergen Reactions    Zanaflex [Tizanidine]      Immunization History   Administered Date(s) Administered    Hep B, adult 06/22/2017, 09/01/2017, 01/08/2018       Patient Care Team:  Gattis Alpers as PCP - General (Family Medicine)    Medicare Screening Tests and Risk Assessments:  Last Medicare Wellness visit information reviewed, patient interviewed, no change since last AWV  Health Risk Assessment:  Patient rates overall health as poor  Patient feels that their physical health rating is Same  Eyesight was rated as Same  Hearing was rated as Same  Patient feels that their emotional and mental health rating is Same  Pain experienced by patient in the last 7 days has been Some  Patient's pain rating has been 5/10  Patient states that he has experienced weight loss or gain in last 6 months  Emotional/Mental Health:  Patient has been feeling nervous/anxious  Broken Bones/Falls: Fall Risk Assessment:    In the past year, patient has experienced: No history of falling in past year          Bladder/Bowel:  Patient has not leaked urine accidently in the last six months  Patient reports no loss of bowel control  Immunizations:  Patient has not had a flu vaccination within the last year  Patient has not received a pneumonia shot  Patient has not received a shingles shot  Home Safety:  Patient has trouble with stairs inside or outside of their home  Patient currently reports that there are no safety hazards present in home, no working smoke alarms, no working carbon monoxide detectors        Preventative Screenings:   no prostate cancer screen performed, colon cancer screen completed, no cholesterol screen completed, no glaucoma eye exam completed    Nutrition:  Current diet: Regular and Frequent junk food with servings of the following:    Medications:  Patient is currently taking over-the-counter supplements  Patient is not able to manage medications  Lifestyle Choices:  Patient reports current tobacco use  Patient has not smoked or used tobacco in the past   Patient reports no alcohol use  Patient drives a vehicle  Patient wears seat belt  Activities of Daily Living:  Can get out of bed by his or her self, able to dress self, able to make own meals, able to do own shopping, unable to bathe self, can do own laundry/housekeeping, can manage own money, pay bills and track expenses    Previous Hospitalizations:  Hospitalization or ED visit in past 12 months  Number of hospitalizations within the last year: more than 4        Advanced Directives:  Patient has decided on a power of   Patient has spoken to designated power of   Patient has completed advanced directive          Preventative Screening/Counseling:      Cardiovascular:      General: Risks and Benefits Discussed and Screening Current      Counseling: Healthy Diet, Healthy Weight, Improve Blood Pressure, Improve Exercise Tolerance and Tobacco Cessation          Diabetes:      General: Risks and Benefits Discussed and Screening Current      Counseling: Healthy Diet, Healthy Weight and Improve Physical Activity          Colorectal Cancer:      General: Risks and Benefits Discussed and Screening Current      Counseling: high fiber diet          Prostate Cancer:      General: Risks and Benefits Discussed      Due for labs: PSA          Osteoporosis:      General: Risks and Benefits Discussed and Patient Declines      Counseling: Calcium and Vitamin D Intake and Regular Weightbearing Exercise          AAA:      General: Risks and Benefits Discussed and Screening Current      Counseling: tobacco cessation counseling given      Comments: Recent CT Scan of Abdomen and Routine US of Abdomen being completed due to Hx of Liver Failure and Cirrhosis of Liver  Glaucoma:      General: Risks and Benefits Discussed and Patient Declines          HIV:      General: Risks and Benefits Discussed, Patient Declines and Screening Current          Hepatitis C:      General: Risks and Benefits Discussed, Screening Current and Patient Declines        Advanced Directives:   Patient has no living will for healthcare, does not have durable POA for healthcare, patient does not have an advanced directive  Information on ACP and/or AD provided  No 5 wishes given  End of life assessment reviewed with patient  Provider agrees with end of life decisions   Additional Comments: Discussed with patient importance of having an Advanced Directive Plan       Immunizations:  Patient reviewed and up to date      Influenza: Risks & Benefits Discussed and Patient Declines      Pneumococcal: Risks & Benefits Discussed and Patient Declines      Shingrix: Patient Declines and Risks & Benefits Discussed      Hepatitis B (Low risk patients): Prevention Counseling      Hepatitis B (Medium to high risk patients): Risks & Benefits Discussed and Hep B Vaccine Series UTD      Zostavax: Risks & Benefits Discussed and Patient Declines      TD: Risks & Benefits Discussed and Patient Declines      TDAP: Risks & Benefits Discussed and Patient Declines      Other Preventative Counseling (Non-Medicare):  Alcohol Use, Fall Prevention, Increase physical activity, Nutrition Counseling, Car/seat belt/driving safety reviewed, Skin self-exam and Sunscreen use      Referrals:  Referral(s) to: Cardiologist  Additional Comments: Patient being followed by GI Specialist and Hepatology     HPI:   Patient presents to office for Annual Medicare Wellness Exam   Complete medical history and medications reviewed with patient and tolerating all medications well without any problems  Patient states he was having alot of pain yesterday in his chest, stomach, and posterior bilateral legs  Patient is feeling light headed and dizziness with generalized weakness occurring  Patient would like to see about decreasing his Lactulose due to he thinks this may be causing him not to feel himself  Patient's blood pressure checked at start of visit with low blood pressure reading of 60/30 then patient reclined on exam table in a supine position and blood pressure rechecked after 15 minutes of laying supine and increased to 70/42  Patient states he feels weak, dizzy, light headed, and feeling he is going to pass out  Instructed patient on importance of being transported to ER via ambulance for further evaluation and treatment of his hypotension, dizziness, weakness, lightheadedness, and near syncopal episodes  Patient became frustrated with yelling profanities and demonstrating belligerent behavior towards myself and staff  Patient was refusing to be transported to hospital via ambulance, demanded to leave office and drive home  Instructed patient that if he left office to drive that the police would be notified to abstain him from driving to prevent any motor vehicle accidents due to his current poor physical state and condition  After an in-depth conversation with patient and significant other he did agree to be transported to hospital via ambulance  Phone call placed to 911 to transport patient to [facility] for evaluation and treatment of patient's current poor physical condition of Severe Hypotension, Liver Failure, Weakness, Dizziness, Near Syncopal Episodes, Chest and Abdominal Pain  While awaiting ambulance arrival, patient demanded to be wheeled outside in wheelchair to smoke a cigarette  Patient instructed on importance of refraining from getting up and into wheelchair due to his hypotension and the possibilty of him passing out but patient continued to refuse    Patient transported via wheelchair by significant other to outside of office where ambulance met patient in parking lot which patient continued to yell profanities at EMS but then agreed to be transported to [facility] which he was then transported to  SUBJECTIVE:  ROS:   GENERAL:  C/O generalized weakness and fatigue occurring  SKIN:  Denies rashes, lesions, opened areas, wounds, change in moles or any other skin changes  HEENT:  Denies any head injury or headaches     Negative blurred vision, floaters, spots before eyes, infections, or other vision problems   Negative significant changes in vision or hearing     Negative tinnitus, vertigo, or infections     Negative hay fever, sinus trouble, nasal discharge, bloody noses, or problems with smell     Negative sore throat, bleeding gums, ulcers, or sores  Glasses/Contacts: Glasses  Hearing Aids: NO  Dentures/Partials/Implants: NO  NECK:  Denies lumps, goiter, pain, swollen glands, or lymphadenopathy  BREASTS:  Denies lumps, pain, nipple discharge, swelling, redness, or any other changes  RESPIRATORY:  Denies cough, wheezing, shortness of breath, dyspnea, or orthopnea  CARDIOVASCULAR:  Patient was having chest pain yesterday,   Denies palpitations  GASTROINTESTINAL:  Appetite good, denies nausea, vomiting, or indigestion   Patient was having abdominal pain yesterday  Bowel movements normal occurring about once daily or every other day  Continues with umbilical hernia  URINARY:  Denies frequency, incontinence, dysuria, hematuria, nocturia, or recent flank pain     GENITAL:  Denies penile discharge, ulcerations, lesions, or other problems   Continues with right inguinal hernia  PERIPHERAL VASCULAR:  Denies varicosities, swelling, skin changes, or pain  MUSCULOSKELETAL:  Denies back, joint, or muscle pain     Negative problems with mobility or movement     PSYCHIATRIC:  Denies problems with depression, anxiety, anger, or other psychiatric symptoms  NEUROLOGIC:  Denies fainting, C/O dizziness and light headedness occurring, no memory problems, seizures, tingling, motor or sensory loss  HEMATOLOGIC:  Denies easy bruising, bleeding, or anemia  ENDOCRINE:  Denies thyroid problems, temperature intolerance, excessive sweating, or other endocrine symptoms  OBJECTIVE:  Physical:  GENERAL:  Appears weak, frail, with generalized muscle atrophy  SKIN:  Palms warm, dry, color good   Nails without clubbing or cyanosis     No lesions, ulcerations, or wounds  HEAD: Binh Mendieta is average texture   Scalp without lesions, normocephalic, and atraumatic  EYES:  Visual fields full by confrontation   Conjunctiva pink, sclera white, PERRLA   Extraocular movements intact  EARS:  B/L ear canals clear   B/L TMs clear with + light reflex     Acuity good to whispered voice     NOSE: Mucosa pink, moist, septum midline   Negative sinus tenderness  B/L turbinates pink, moist, non-edematous without exudate  MOUTH:  Oral mucosa pink   Pharynx pink, moist, without swelling, redness, or exudate  Dentition ok   Tonsils without enlargement or exudate   Tongue midline  NECK:  Supple, trachea midline, Negative thyromegaly, lymphadenopathy, or swollen glands  LYMPH NODES:  Negative enlargement of neck, axillary, epitrochlear, or inguinal nodes  THORAX/LUNGS  Thorax symmetric with good excursion  Lungs resonant   Breath sounds wheezing and crackles of left lower lobe  Diaphragm descends within normal limits  CARDIOVASCULAR:  Carotid upstrokes brisk and without bruits  Apical impulse discrete and tapping, barely palpable in the 5th ICS/MCL     Normal S1 and Normal S2, Negative S3 or S4     Negative murmurs, thrills, lifts, or heaves  ABDOMEN:  Protuberant, bowel sounds normal active x 4 quadrants   Severe ascites of abdomen  Umbilical hernia  Right inguinal hernia    + generalized abdominal tenderness   Negative masses    + hepatomegaly   Negative splenomegaly  Negative costovertebral tenderness  EXTREMITIES:  Warm, calves supple, non-tender, negative for edema   Negative stasis pigmentation or ulcers  +2 pulses throughout  MUSCULOSKELETAL:  Negative joint deformities     Good range of motion in hands, wrists, elbows, shoulders, spine, hips, knees, and ankles  Negative spinal curvature  NEUROLOGICAL:  Mental status:  Awake, alert, and oriented to person, place, time, and event   Normal thought processes

## 2018-11-28 ENCOUNTER — TELEPHONE (OUTPATIENT)
Dept: FAMILY MEDICINE CLINIC | Facility: CLINIC | Age: 58
End: 2018-11-28

## 2018-11-29 DIAGNOSIS — D63.8 ANEMIA IN OTHER CHRONIC DISEASES CLASSIFIED ELSEWHERE: ICD-10-CM

## 2018-11-29 RX ORDER — FERROUS SULFATE 325(65) MG
1 TABLET ORAL 2 TIMES DAILY WITH MEALS
Qty: 180 TABLET | Refills: 1 | Status: SHIPPED | OUTPATIENT
Start: 2018-11-29 | End: 2018-12-07 | Stop reason: SDUPTHER

## 2018-11-29 NOTE — TELEPHONE ENCOUNTER
Please print up medication list with dosages and frequency then fax to [facility] for their review

## 2018-12-07 DIAGNOSIS — D63.8 ANEMIA IN OTHER CHRONIC DISEASES CLASSIFIED ELSEWHERE: ICD-10-CM

## 2018-12-07 RX ORDER — FERROUS SULFATE 325(65) MG
1 TABLET ORAL 2 TIMES DAILY WITH MEALS
Qty: 180 TABLET | Refills: 0 | Status: SHIPPED | OUTPATIENT
Start: 2018-12-07 | End: 2018-12-11 | Stop reason: SDUPTHER

## 2018-12-11 DIAGNOSIS — D63.8 ANEMIA IN OTHER CHRONIC DISEASES CLASSIFIED ELSEWHERE: ICD-10-CM

## 2018-12-11 RX ORDER — FERROUS SULFATE 325(65) MG
1 TABLET ORAL 2 TIMES DAILY WITH MEALS
Qty: 180 TABLET | Refills: 0 | Status: SHIPPED | OUTPATIENT
Start: 2018-12-11

## 2018-12-12 DIAGNOSIS — K74.69 OTHER CIRRHOSIS OF LIVER (HCC): ICD-10-CM

## 2018-12-12 RX ORDER — RIFAXIMIN 550 MG/1
TABLET ORAL
Qty: 60 TABLET | Refills: 5 | Status: SHIPPED | OUTPATIENT
Start: 2018-12-12 | End: 2019-01-11